# Patient Record
Sex: FEMALE | Race: BLACK OR AFRICAN AMERICAN | Employment: PART TIME | URBAN - METROPOLITAN AREA
[De-identification: names, ages, dates, MRNs, and addresses within clinical notes are randomized per-mention and may not be internally consistent; named-entity substitution may affect disease eponyms.]

---

## 2024-10-18 ENCOUNTER — APPOINTMENT (EMERGENCY)
Dept: CT IMAGING | Facility: HOSPITAL | Age: 39
End: 2024-10-18
Payer: MEDICAID

## 2024-10-18 ENCOUNTER — HOSPITAL ENCOUNTER (EMERGENCY)
Facility: HOSPITAL | Age: 39
Discharge: HOME | End: 2024-10-19
Attending: EMERGENCY MEDICINE
Payer: MEDICAID

## 2024-10-18 VITALS
OXYGEN SATURATION: 99 % | RESPIRATION RATE: 18 BRPM | DIASTOLIC BLOOD PRESSURE: 62 MMHG | HEART RATE: 90 BPM | TEMPERATURE: 98.7 F | SYSTOLIC BLOOD PRESSURE: 132 MMHG

## 2024-10-18 DIAGNOSIS — Z75.8 DOES NOT HAVE PRIMARY CARE PROVIDER: ICD-10-CM

## 2024-10-18 DIAGNOSIS — R51.9 ACUTE HEADACHE: ICD-10-CM

## 2024-10-18 DIAGNOSIS — R40.20 LOSS OF CONSCIOUSNESS (HCC): Primary | ICD-10-CM

## 2024-10-18 LAB
ALBUMIN SERPL BCG-MCNC: 4.5 G/DL (ref 3.5–5)
ALP SERPL-CCNC: 64 U/L (ref 34–104)
ALT SERPL W P-5'-P-CCNC: 39 U/L (ref 7–52)
ANION GAP SERPL CALCULATED.3IONS-SCNC: 10 MMOL/L (ref 4–13)
APTT PPP: 28 SECONDS (ref 23–34)
AST SERPL W P-5'-P-CCNC: 28 U/L (ref 13–39)
BASOPHILS # BLD AUTO: 0.07 THOUSANDS/ΜL (ref 0–0.1)
BASOPHILS NFR BLD AUTO: 1 % (ref 0–1)
BILIRUB DIRECT SERPL-MCNC: 0.07 MG/DL (ref 0–0.2)
BILIRUB SERPL-MCNC: 0.41 MG/DL (ref 0.2–1)
BILIRUB UR QL STRIP: NEGATIVE
BUN SERPL-MCNC: 10 MG/DL (ref 5–25)
CALCIUM SERPL-MCNC: 9 MG/DL (ref 8.4–10.2)
CHLORIDE SERPL-SCNC: 103 MMOL/L (ref 96–108)
CLARITY UR: CLEAR
CO2 SERPL-SCNC: 22 MMOL/L (ref 21–32)
COLOR UR: ABNORMAL
CREAT SERPL-MCNC: 0.88 MG/DL (ref 0.6–1.3)
EOSINOPHIL # BLD AUTO: 0.07 THOUSAND/ΜL (ref 0–0.61)
EOSINOPHIL NFR BLD AUTO: 1 % (ref 0–6)
ERYTHROCYTE [DISTWIDTH] IN BLOOD BY AUTOMATED COUNT: 23.8 % (ref 11.6–15.1)
EXT PREGNANCY TEST URINE: NEGATIVE
EXT. CONTROL: NORMAL
FLUAV AG UPPER RESP QL IA.RAPID: NEGATIVE
FLUBV AG UPPER RESP QL IA.RAPID: NEGATIVE
GFR SERPL CREATININE-BSD FRML MDRD: 83 ML/MIN/1.73SQ M
GLUCOSE SERPL-MCNC: 147 MG/DL (ref 65–140)
GLUCOSE UR STRIP-MCNC: NEGATIVE MG/DL
HCT VFR BLD AUTO: 37.4 % (ref 34.8–46.1)
HGB BLD-MCNC: 12.2 G/DL (ref 11.5–15.4)
HGB UR QL STRIP.AUTO: NEGATIVE
IMM GRANULOCYTES # BLD AUTO: 0.01 THOUSAND/UL (ref 0–0.2)
IMM GRANULOCYTES NFR BLD AUTO: 0 % (ref 0–2)
INR PPP: 1.12 (ref 0.85–1.19)
KETONES UR STRIP-MCNC: ABNORMAL MG/DL
LEUKOCYTE ESTERASE UR QL STRIP: NEGATIVE
LYMPHOCYTES # BLD AUTO: 2.62 THOUSANDS/ΜL (ref 0.6–4.47)
LYMPHOCYTES NFR BLD AUTO: 40 % (ref 14–44)
MAGNESIUM SERPL-MCNC: 2.1 MG/DL (ref 1.9–2.7)
MCH RBC QN AUTO: 22.5 PG (ref 26.8–34.3)
MCHC RBC AUTO-ENTMCNC: 32.6 G/DL (ref 31.4–37.4)
MCV RBC AUTO: 69 FL (ref 82–98)
MONOCYTES # BLD AUTO: 0.84 THOUSAND/ΜL (ref 0.17–1.22)
MONOCYTES NFR BLD AUTO: 13 % (ref 4–12)
NEUTROPHILS # BLD AUTO: 2.94 THOUSANDS/ΜL (ref 1.85–7.62)
NEUTS SEG NFR BLD AUTO: 45 % (ref 43–75)
NITRITE UR QL STRIP: NEGATIVE
NRBC BLD AUTO-RTO: 0 /100 WBCS
PH UR STRIP.AUTO: 8 [PH]
PHENYTOIN SERPL-MCNC: <2.5 UG/ML (ref 10–20)
PLATELET # BLD AUTO: 162 THOUSANDS/UL (ref 149–390)
POTASSIUM SERPL-SCNC: 3.8 MMOL/L (ref 3.5–5.3)
PROT SERPL-MCNC: 7.7 G/DL (ref 6.4–8.4)
PROT UR STRIP-MCNC: NEGATIVE MG/DL
PROTHROMBIN TIME: 15.1 SECONDS (ref 12.3–15)
RBC # BLD AUTO: 5.43 MILLION/UL (ref 3.81–5.12)
SARS-COV+SARS-COV-2 AG RESP QL IA.RAPID: NEGATIVE
SODIUM SERPL-SCNC: 135 MMOL/L (ref 135–147)
SP GR UR STRIP.AUTO: 1.01 (ref 1–1.03)
TSH SERPL DL<=0.05 MIU/L-ACNC: 1.1 UIU/ML (ref 0.45–4.5)
UROBILINOGEN UR STRIP-ACNC: <2 MG/DL
WBC # BLD AUTO: 6.55 THOUSAND/UL (ref 4.31–10.16)

## 2024-10-18 PROCEDURE — 80076 HEPATIC FUNCTION PANEL: CPT | Performed by: EMERGENCY MEDICINE

## 2024-10-18 PROCEDURE — 93005 ELECTROCARDIOGRAM TRACING: CPT

## 2024-10-18 PROCEDURE — 99285 EMERGENCY DEPT VISIT HI MDM: CPT | Performed by: EMERGENCY MEDICINE

## 2024-10-18 PROCEDURE — 96366 THER/PROPH/DIAG IV INF ADDON: CPT

## 2024-10-18 PROCEDURE — 85730 THROMBOPLASTIN TIME PARTIAL: CPT | Performed by: EMERGENCY MEDICINE

## 2024-10-18 PROCEDURE — 81025 URINE PREGNANCY TEST: CPT | Performed by: EMERGENCY MEDICINE

## 2024-10-18 PROCEDURE — 99284 EMERGENCY DEPT VISIT MOD MDM: CPT

## 2024-10-18 PROCEDURE — 96365 THER/PROPH/DIAG IV INF INIT: CPT

## 2024-10-18 PROCEDURE — 84443 ASSAY THYROID STIM HORMONE: CPT | Performed by: EMERGENCY MEDICINE

## 2024-10-18 PROCEDURE — 81003 URINALYSIS AUTO W/O SCOPE: CPT | Performed by: EMERGENCY MEDICINE

## 2024-10-18 PROCEDURE — 80048 BASIC METABOLIC PNL TOTAL CA: CPT | Performed by: EMERGENCY MEDICINE

## 2024-10-18 PROCEDURE — 36415 COLL VENOUS BLD VENIPUNCTURE: CPT | Performed by: EMERGENCY MEDICINE

## 2024-10-18 PROCEDURE — 83735 ASSAY OF MAGNESIUM: CPT | Performed by: EMERGENCY MEDICINE

## 2024-10-18 PROCEDURE — 80185 ASSAY OF PHENYTOIN TOTAL: CPT | Performed by: EMERGENCY MEDICINE

## 2024-10-18 PROCEDURE — 87804 INFLUENZA ASSAY W/OPTIC: CPT | Performed by: EMERGENCY MEDICINE

## 2024-10-18 PROCEDURE — 85610 PROTHROMBIN TIME: CPT | Performed by: EMERGENCY MEDICINE

## 2024-10-18 PROCEDURE — 70496 CT ANGIOGRAPHY HEAD: CPT

## 2024-10-18 PROCEDURE — 96375 TX/PRO/DX INJ NEW DRUG ADDON: CPT

## 2024-10-18 PROCEDURE — 85025 COMPLETE CBC W/AUTO DIFF WBC: CPT | Performed by: EMERGENCY MEDICINE

## 2024-10-18 PROCEDURE — 87811 SARS-COV-2 COVID19 W/OPTIC: CPT | Performed by: EMERGENCY MEDICINE

## 2024-10-18 RX ORDER — METOCLOPRAMIDE HYDROCHLORIDE 5 MG/ML
10 INJECTION INTRAMUSCULAR; INTRAVENOUS ONCE
Status: COMPLETED | OUTPATIENT
Start: 2024-10-18 | End: 2024-10-18

## 2024-10-18 RX ORDER — MAGNESIUM SULFATE HEPTAHYDRATE 40 MG/ML
2 INJECTION, SOLUTION INTRAVENOUS ONCE
Status: COMPLETED | OUTPATIENT
Start: 2024-10-18 | End: 2024-10-19

## 2024-10-18 RX ORDER — DIPHENHYDRAMINE HYDROCHLORIDE 50 MG/ML
25 INJECTION INTRAMUSCULAR; INTRAVENOUS ONCE
Status: COMPLETED | OUTPATIENT
Start: 2024-10-18 | End: 2024-10-18

## 2024-10-18 RX ORDER — PHENYTOIN SODIUM 100 MG/1
200 CAPSULE, EXTENDED RELEASE ORAL DAILY
COMMUNITY

## 2024-10-18 RX ADMIN — MAGNESIUM SULFATE HEPTAHYDRATE 2 G: 40 INJECTION, SOLUTION INTRAVENOUS at 20:00

## 2024-10-18 RX ADMIN — DIPHENHYDRAMINE HYDROCHLORIDE 25 MG: 50 INJECTION, SOLUTION INTRAMUSCULAR; INTRAVENOUS at 20:00

## 2024-10-18 RX ADMIN — IOHEXOL 85 ML: 350 INJECTION, SOLUTION INTRAVENOUS at 21:17

## 2024-10-18 RX ADMIN — METOCLOPRAMIDE 10 MG: 5 INJECTION, SOLUTION INTRAMUSCULAR; INTRAVENOUS at 20:00

## 2024-10-18 NOTE — ED PROVIDER NOTES
"Time reflects when diagnosis was documented in both MDM as applicable and the Disposition within this note       Time User Action Codes Description Comment    10/18/2024 11:42 PM Dolly Alex [R40.20] Loss of consciousness (HCC)     10/18/2024 11:42 PM Dolly Alex [R51.9] Acute headache     10/18/2024 11:47 PM Dolly Alex [Z75.8] Does not have primary care provider           ED Disposition       ED Disposition   Discharge    Condition   Fair    Date/Time   Fri Oct 18, 2024 11:42 PM    Comment   Philipalix Deniz discharge to home/self care.             Assessment & Plan       Medical Decision Making  This is a 38-year-old female who presents here today with an episode of loss of consciousness.  The patient says she was sitting eating dinner when she developed sudden onset of severe headache, loss consciousness.  She says she heard voices around her but was unable to move or respond.  She is having persistent headache.  She has had recent \"flu\" with nasal congestion and cough but denies any fevers.  She denies preceding head injuries or falling with the event tonight.  She denies any weakness or numbness of arms or legs.  She initially denied any prior problems with headaches, but then later says that she has had headaches with \"spells\" previously.  She does endorse prior episodes of loss of consciousness.  She said this happened during her menstrual cycle, and she was started on a medicine for it, that she takes when she has the \"spells.\"  She does state that she has been anemic before and taking medication to help with that.  She has a tablet of 100 mg of phenytoin with her, that she says is what she was prescribed to help with her \"spells,\" and only takes it that she needs it.  She denies taking it daily, any prior diagnosis of seizure disorders, any history of migraines.  She denies any other medical problems, including problems with her heart.  She " has no chest pain, palpitations, shortness of breath, recent nausea, vomiting, diarrhea.  Family told nursing that she seemed to be staring off into space and there is no witnessed seizure-like activity.  She did not fall out of the chair she was sitting in.    Review of systems: Otherwise negative unless stated as above    She is well-appearing, no acute distress.  She is occasionally repetitive, and occasionally has to have questions asked several times in order to be able to answer them.  She has no focal neurologic deficits.  Exam is otherwise unremarkable.  She may or may not have history of headaches, but given reported sudden onset of headache preceding syncopal event, there is concern for possible subarachnoid hemorrhage from aneurysmal bleed.  She may not have had full loss of consciousness that she is aware of voices around her, and with Dilantin use there is concern for possible history of seizures, though she denies a history of this and only strength of 100 mg, with as needed dosing is not typical for treatment of seizure disorder.  This may be due to dehydration, JOHN, electrolyte or thyroid abnormality, dysrhythmia.  I am not concerned about meningitis or encephalitis with recent URI symptoms given lack of fever or neck pain.  Will check lab work and CT scan to evaluate, and treat headache.    Glucose is mildly elevated at 147.  MCV is low at 69 though hemoglobin is normal.  Phenytoin level is undetectable.  Labs are otherwise unremarkable.  CT/CTA shows no acute abnormalities.  Headache has resolved.  I discussed with her findings, uncertain etiology of symptoms, management at home, need to establish follow-up care locally, and trying to get records from her OB doctor to better determine diagnosis and help with development of further treatment plan, and indications for return, and she expresses understanding with this plan.    Problems Addressed:  Acute headache: acute illness or injury  Does not have  "primary care provider: chronic illness or injury  Loss of consciousness (HCC): acute illness or injury    Amount and/or Complexity of Data Reviewed  Labs: ordered. Decision-making details documented in ED Course.  Radiology: ordered and independent interpretation performed. Decision-making details documented in ED Course.  ECG/medicine tests: ordered and independent interpretation performed. Decision-making details documented in ED Course.    Risk  Prescription drug management.             Medications   metoclopramide (REGLAN) injection 10 mg (10 mg Intravenous Given 10/18/24 2000)   diphenhydrAMINE (BENADRYL) injection 25 mg (25 mg Intravenous Given 10/18/24 2000)   magnesium sulfate 2 g/50 mL IVPB (premix) 2 g (2 g Intravenous New Bag 10/18/24 2000)   iohexol (OMNIPAQUE) 350 MG/ML injection (MULTI-DOSE) 85 mL (85 mL Intravenous Given 10/18/24 2117)       ED Risk Strat Scores                           SBIRT 22yo+      Flowsheet Row Most Recent Value   Initial Alcohol Screen: US AUDIT-C     1. How often do you have a drink containing alcohol? 0 Filed at: 10/18/2024 1836   2. How many drinks containing alcohol do you have on a typical day you are drinking?  0 Filed at: 10/18/2024 1836   3b. FEMALE Any Age, or MALE 65+: How often do you have 4 or more drinks on one occassion? 0 Filed at: 10/18/2024 1836   Audit-C Score 0 Filed at: 10/18/2024 1836   KATELIN: How many times in the past year have you...    Used an illegal drug or used a prescription medication for non-medical reasons? Never Filed at: 10/18/2024 1836                            History of Present Illness       Chief Complaint   Patient presents with    Syncope     Patient arrived to ED via EMS \"states she was about to eat and had syncope episode while in sitting position\", patient is alert and oriented x 4, c/o frontal headache\"       Past Medical History:   Diagnosis Date    Seizure (HCC)       History reviewed. No pertinent surgical history.   History " reviewed. No pertinent family history.   Social History     Tobacco Use    Smoking status: Never    Smokeless tobacco: Never   Substance Use Topics    Alcohol use: Never    Drug use: Never      E-Cigarette/Vaping      E-Cigarette/Vaping Substances      I have reviewed and agree with the history as documented.        used: [facility] [employer] [facility] [id number] for history, [id number] for results and discharge.        Review of Systems   Cardiovascular:  Positive for syncope.           Objective       ED Triage Vitals [10/18/24 1832]   Temperature Pulse Blood Pressure Respirations SpO2 Patient Position - Orthostatic VS   98.7 °F (37.1 °C) 93 148/76 17 100 % --      Temp Source Heart Rate Source BP Location FiO2 (%) Pain Score    Oral Monitor Right arm -- --      Vitals      Date and Time Temp Pulse SpO2 Resp BP Pain Score FACES Pain Rating User   10/18/24 2302 -- 90 99 % 18 -- -- -- KB   10/18/24 2100 -- 93 99 % 18 -- -- -- KB   10/18/24 2030 -- 90 99 % 18 132/62 -- -- KB   10/18/24 1832 98.7 °F (37.1 °C) 93 100 % 17 148/76 -- -- MI            Physical Exam  Vitals and nursing note reviewed.   Constitutional:       General: She is not in acute distress.     Appearance: She is well-developed.   HENT:      Head: Normocephalic and atraumatic.   Eyes:      Extraocular Movements: Extraocular movements intact.      Conjunctiva/sclera: Conjunctivae normal.      Pupils: Pupils are equal, round, and reactive to light.   Neck:      Trachea: No tracheal deviation.   Cardiovascular:      Rate and Rhythm: Normal rate and regular rhythm.      Heart sounds: Normal heart sounds.   Pulmonary:      Effort: Pulmonary effort is normal. No respiratory distress.      Breath sounds: Normal breath sounds.   Abdominal:      General: There is no distension.      Palpations: Abdomen is soft.      Tenderness: There is no abdominal tenderness.   Musculoskeletal:      Cervical back: Normal range of motion. No pain with movement. Normal range of motion.    Skin:     General: Skin is warm and dry.   Neurological:      General: No focal deficit present.      Mental Status: She is alert and oriented to person, place, and time.      GCS: GCS eye subscore is 4. GCS verbal subscore is 5. GCS motor subscore is 6.      Cranial Nerves: No cranial nerve deficit, dysarthria or facial asymmetry.      Sensory: Sensation is intact. No sensory deficit.      Motor: Motor function is intact. No weakness.      Coordination: Coordination normal. Finger-Nose-Finger Test normal.      Comments: Occasionally repetitive, occasionally needs questions repeated to be able to answer them   Psychiatric:         Mood and Affect: Mood and affect normal.         Behavior: Behavior normal.         Results Reviewed       Procedure Component Value Units Date/Time    FLU/COVID Rapid Antigen (30 min. TAT) - Preferred screening test in ED [[id number]]  (Normal) Collected: 10/18/24 1958    Lab Status: Final result Specimen: Nares from Nose Updated: 10/18/24 2040     SARS COV Rapid Antigen Negative     Influenza A Rapid Antigen Negative     Influenza B Rapid Antigen Negative    Narrative:      This test has been performed using the Quidel Amy 2 FLU+SARS Antigen test under the Emergency Use Authorization (EUA). This test has been validated by the  and verified by the performing laboratory. The Amy uses lateral flow immunofluorescent sandwich assay to detect SARS-COV, Influenza A and Influenza B Antigen.     The Quidel Amy 2 SARS Antigen test does not differentiate between SARS-CoV and SARS-CoV-2.     Negative results are presumptive and may be confirmed with a molecular assay, if necessary, for patient management. Negative results do not rule out SARS-CoV-2 or influenza infection and should not be used as the sole basis for treatment or patient management decisions. A negative test result may occur if the level of antigen in a sample is below the limit of detection of this test.      Positive results are indicative of the presence of viral antigens, but do not rule out bacterial infection or co-infection with other viruses.     All test results should be used as an adjunct to clinical observations and other information available to the provider.    FOR PEDIATRIC PATIENTS - copy/paste COVID Guidelines URL to browser: [email or web address]    TSH, 3rd generation with Free T4 reflex [[id number]]  (Normal) Collected: 10/18/24 1958    Lab Status: Final result Specimen: Blood from Arm, Right Updated: 10/18/24 2039     TSH 3RD GENERATON 1.105 uIU/mL     Basic metabolic panel [[id number]]  (Abnormal) Collected: 10/18/24 1958    Lab Status: Final result Specimen: Blood from Arm, Right Updated: 10/18/24 2034     Sodium 135 mmol/L      Potassium 3.8 mmol/L      Chloride 103 mmol/L      CO2 22 mmol/L      ANION GAP 10 mmol/L      BUN 10 mg/dL      Creatinine 0.88 mg/dL      Glucose 147 mg/dL      Calcium 9.0 mg/dL      eGFR 83 ml/min/1.73sq m     Narrative:      National Kidney Disease Foundation guidelines for Chronic Kidney Disease (CKD):     Stage 1 with normal or high GFR (GFR > 90 mL/min/1.73 square meters)    Stage 2 Mild CKD (GFR = 60-89 mL/min/1.73 square meters)    Stage 3A Moderate CKD (GFR = 45-59 mL/min/1.73 square meters)    Stage 3B Moderate CKD (GFR = 30-44 mL/min/1.73 square meters)    Stage 4 Severe CKD (GFR = 15-29 mL/min/1.73 square meters)    Stage 5 End Stage CKD (GFR <15 mL/min/1.73 square meters)  Note: GFR calculation is accurate only with a steady state creatinine    Hepatic function panel [[id number]]  (Normal) Collected: 10/18/24 1958    Lab Status: Final result Specimen: Blood from Arm, Right Updated: 10/18/24 2034     Total Bilirubin 0.41 mg/dL      Bilirubin, Direct 0.07 mg/dL      Alkaline Phosphatase 64 U/L      AST 28 U/L      ALT 39 U/L      Total Protein 7.7 g/dL      Albumin 4.5 g/dL     Magnesium [[id number]]  (Normal)  Collected: 10/18/24 1958    Lab Status: Final result Specimen: Blood from Arm, Right Updated: 10/18/24 2034     Magnesium 2.1 mg/dL     Phenytoin level, total [[id number]]  (Abnormal) Collected: 10/18/24 1958    Lab Status: Final result Specimen: Blood from Arm, Right Updated: 10/18/24 2034     Phenytoin Lvl <2.5 ug/mL     Protime-INR [[id number]]  (Abnormal) Collected: 10/18/24 1958    Lab Status: Final result Specimen: Blood from Arm, Right Updated: 10/18/24 2030     Protime 15.1 seconds      INR 1.12    Narrative:      INR Therapeutic Range    Indication                                             INR Range      Atrial Fibrillation                                               2.0-3.0  Hypercoagulable State                                    2.0.2.3  Left Ventricular Asist Device                            2.0-3.0  Mechanical Heart Valve                                  -    Aortic(with afib, MI, embolism, HF, LA enlargement,    and/or coagulopathy)                                     2.0-3.0 (2.5-3.5)     Mitral                                                             2.5-3.5  Prosthetic/Bioprosthetic Heart Valve               2.0-3.0  Venous thromboembolism (VTE: VT, PE        2.0-3.0    APTT [[id number]]  (Normal) Collected: 10/18/24 1958    Lab Status: Final result Specimen: Blood from Arm, Right Updated: 10/18/24 2030     PTT 28 seconds     UA w Reflex to Microscopic w Reflex to Culture [[id number]]  (Abnormal) Collected: 10/18/24 2005    Lab Status: Final result Specimen: Urine, Clean Catch Updated: 10/18/24 2029     Color, UA Light Yellow     Clarity, UA Clear     Specific Gravity, UA 1.012     pH, UA 8.0     Leukocytes, UA Negative     Nitrite, UA Negative     Protein, UA Negative mg/dl      Glucose, UA Negative mg/dl      Ketones, UA Trace mg/dl      Urobilinogen, UA <2.0 mg/dl      Bilirubin, UA Negative     Occult Blood, UA Negative    CBC and differential [[id number]]  (Abnormal) Collected: 10/18/24  1958    Lab Status: Final result Specimen: Blood from Arm, Right Updated: 10/18/24 2015     WBC 6.55 Thousand/uL      RBC 5.43 Million/uL      Hemoglobin 12.2 g/dL      Hematocrit 37.4 %      MCV 69 fL      MCH 22.5 pg      MCHC 32.6 g/dL      RDW 23.8 %      Platelets 162 Thousands/uL      nRBC 0 /100 WBCs      Segmented % 45 %      Immature Grans % 0 %      Lymphocytes % 40 %      Monocytes % 13 %      Eosinophils Relative 1 %      Basophils Relative 1 %      Absolute Neutrophils 2.94 Thousands/µL      Absolute Immature Grans 0.01 Thousand/uL      Absolute Lymphocytes 2.62 Thousands/µL      Absolute Monocytes 0.84 Thousand/µL      Eosinophils Absolute 0.07 Thousand/µL      Basophils Absolute 0.07 Thousands/µL     POCT pregnancy, urine [[id number]]  (Normal) Resulted: 10/18/24 2009    Lab Status: Final result Updated: 10/18/24 2009     EXT Preg Test, Ur Negative     Control Valid            CTA head with and without contrast   Final Interpretation by Janel Kerr MD (10/18 2320)      No acute intracranial abnormality.      No large vessel occlusion. No hemodynamically significant stenosis.      Workstation performed: [id number]             ECG 12 Lead Documentation Only    Date/Time: 10/18/2024 7:46 PM    Performed by: Dolly Alex MD  Authorized by: Dolly Alex MD    Indications / Diagnosis:  Syncope  ECG reviewed by me, the ED Provider: yes    Patient location:  ED  Previous ECG:     Previous ECG:  Unavailable  Interpretation:     Interpretation: normal    Rate:     ECG rate:  85    ECG rate assessment: normal    Rhythm:     Rhythm: sinus rhythm    Ectopy:     Ectopy: none    QRS:     QRS axis:  Normal    QRS intervals:  Normal  Conduction:     Conduction: normal    ST segments:     ST segments:  Normal  T waves:     T waves: normal        ED Medication and Procedure Management   Prior to Admission Medications   Prescriptions Last Dose Informant Patient Reported? Taking?    phenytoin (DILANTIN) 100 mg ER capsule  Self Yes Yes   Sig: Take 200 mg by mouth daily Patient takes 100 mg in AM and then 100 mg in PM      Facility-Administered Medications: None     Patient's Medications   Discharge Prescriptions    No medications on file       ED SEPSIS DOCUMENTATION   Time reflects when diagnosis was documented in both MDM as applicable and the Disposition within this note       Time User Action Codes Description Comment    10/18/2024 11:42 PM Dolly Alex [R40.20] Loss of consciousness (HCC)     10/18/2024 11:42 PM Dolly Alex [R51.9] Acute headache     10/18/2024 11:47 PM Dolly Alex [Z75.8] Does not have primary care provider                  Dolly Alex MD  10/19/24 0019

## 2024-10-19 LAB
ATRIAL RATE: 85 BPM
P AXIS: 67 DEGREES
PR INTERVAL: 136 MS
QRS AXIS: 81 DEGREES
QRSD INTERVAL: 82 MS
QT INTERVAL: 348 MS
QTC INTERVAL: 414 MS
T WAVE AXIS: 66 DEGREES
VENTRICULAR RATE: 85 BPM

## 2024-10-19 PROCEDURE — 93010 ELECTROCARDIOGRAM REPORT: CPT | Performed by: STUDENT IN AN ORGANIZED HEALTH CARE EDUCATION/TRAINING PROGRAM

## 2024-10-19 NOTE — DISCHARGE INSTRUCTIONS
Set up care with a new primary care doctor for further evaluation and management of your symptoms.  Try to get in touch with your old doctor from the [place] to get records on prior workup and diagnoses that you were given.  Be careful driving until you follow-up.  Return for worsening or changing symptoms, or for any other concerns.

## 2024-11-25 ENCOUNTER — HOSPITAL ENCOUNTER (INPATIENT)
Facility: HOSPITAL | Age: 39
LOS: 2 days | Discharge: HOME | DRG: 053 | End: 2024-11-27
Attending: EMERGENCY MEDICINE | Admitting: STUDENT IN AN ORGANIZED HEALTH CARE EDUCATION/TRAINING PROGRAM
Payer: MEDICAID

## 2024-11-25 ENCOUNTER — APPOINTMENT (EMERGENCY)
Dept: CT IMAGING | Facility: HOSPITAL | Age: 39
DRG: 053 | End: 2024-11-25
Payer: MEDICAID

## 2024-11-25 ENCOUNTER — APPOINTMENT (EMERGENCY)
Dept: RADIOLOGY | Facility: HOSPITAL | Age: 39
DRG: 053 | End: 2024-11-25
Payer: MEDICAID

## 2024-11-25 ENCOUNTER — TELEPHONE (OUTPATIENT)
Age: 39
End: 2024-11-25

## 2024-11-25 DIAGNOSIS — K35.80 ACUTE APPENDICITIS, UNSPECIFIED ACUTE APPENDICITIS TYPE: Primary | ICD-10-CM

## 2024-11-25 DIAGNOSIS — R56.9 SEIZURE (HCC): ICD-10-CM

## 2024-11-25 DIAGNOSIS — Z13.9 ENCOUNTER FOR SCREENING INVOLVING SOCIAL DETERMINANTS OF HEALTH (SDOH): ICD-10-CM

## 2024-11-25 DIAGNOSIS — R10.31 ACUTE RIGHT LOWER QUADRANT PAIN: ICD-10-CM

## 2024-11-25 DIAGNOSIS — K35.80 ACUTE APPENDICITIS: ICD-10-CM

## 2024-11-25 DIAGNOSIS — R56.9 SEIZURES (HCC): ICD-10-CM

## 2024-11-25 PROBLEM — K35.30 ACUTE APPENDICITIS WITH LOCALIZED PERITONITIS: Status: ACTIVE | Noted: 2024-11-25

## 2024-11-25 LAB
ALBUMIN SERPL BCG-MCNC: 4.1 G/DL (ref 3.5–5)
ALP SERPL-CCNC: 56 U/L (ref 34–104)
ALT SERPL W P-5'-P-CCNC: 19 U/L (ref 7–52)
AMPHETAMINES SERPL QL SCN: NEGATIVE
ANION GAP SERPL CALCULATED.3IONS-SCNC: 7 MMOL/L (ref 4–13)
ANISOCYTOSIS BLD QL SMEAR: PRESENT
AST SERPL W P-5'-P-CCNC: 17 U/L (ref 13–39)
ATRIAL RATE: 97 BPM
BARBITURATES UR QL: NEGATIVE
BASOPHILS # BLD MANUAL: 0.07 THOUSAND/UL (ref 0–0.1)
BASOPHILS NFR MAR MANUAL: 1 % (ref 0–1)
BENZODIAZ UR QL: NEGATIVE
BILIRUB DIRECT SERPL-MCNC: 0.07 MG/DL (ref 0–0.2)
BILIRUB SERPL-MCNC: 0.36 MG/DL (ref 0.2–1)
BILIRUB UR QL STRIP: NEGATIVE
BUN SERPL-MCNC: 6 MG/DL (ref 5–25)
CALCIUM SERPL-MCNC: 8.6 MG/DL (ref 8.4–10.2)
CHLORIDE SERPL-SCNC: 106 MMOL/L (ref 96–108)
CLARITY UR: CLEAR
CO2 SERPL-SCNC: 22 MMOL/L (ref 21–32)
COCAINE UR QL: NEGATIVE
COLOR UR: COLORLESS
CREAT SERPL-MCNC: 0.78 MG/DL (ref 0.6–1.3)
EOSINOPHIL # BLD MANUAL: 0 THOUSAND/UL (ref 0–0.4)
EOSINOPHIL NFR BLD MANUAL: 0 % (ref 0–6)
ERYTHROCYTE [DISTWIDTH] IN BLOOD BY AUTOMATED COUNT: 18.5 % (ref 11.6–15.1)
FENTANYL UR QL SCN: NEGATIVE
FLUAV AG UPPER RESP QL IA.RAPID: NEGATIVE
FLUBV AG UPPER RESP QL IA.RAPID: NEGATIVE
GFR SERPL CREATININE-BSD FRML MDRD: 96 ML/MIN/1.73SQ M
GLUCOSE SERPL-MCNC: 122 MG/DL (ref 65–140)
GLUCOSE UR STRIP-MCNC: NEGATIVE MG/DL
HCG SERPL QL: NEGATIVE
HCT VFR BLD AUTO: 36.4 % (ref 34.8–46.1)
HGB BLD-MCNC: 12.3 G/DL (ref 11.5–15.4)
HGB UR QL STRIP.AUTO: NEGATIVE
HYDROCODONE UR QL SCN: NEGATIVE
HYPERCHROMIA BLD QL SMEAR: PRESENT
KETONES UR STRIP-MCNC: NEGATIVE MG/DL
LACTATE SERPL-SCNC: 1.2 MMOL/L (ref 0.5–2)
LEUKOCYTE ESTERASE UR QL STRIP: NEGATIVE
LYMPHOCYTES # BLD AUTO: 1.77 THOUSAND/UL (ref 0.6–4.47)
LYMPHOCYTES # BLD AUTO: 25 % (ref 14–44)
MAGNESIUM SERPL-MCNC: 2 MG/DL (ref 1.9–2.7)
MCH RBC QN AUTO: 24 PG (ref 26.8–34.3)
MCHC RBC AUTO-ENTMCNC: 33.8 G/DL (ref 31.4–37.4)
MCV RBC AUTO: 71 FL (ref 82–98)
METHADONE UR QL: NEGATIVE
MICROCYTES BLD QL AUTO: PRESENT
MONOCYTES # BLD AUTO: 0.14 THOUSAND/UL (ref 0–1.22)
MONOCYTES NFR BLD: 2 % (ref 4–12)
NEUTROPHILS # BLD MANUAL: 5.09 THOUSAND/UL (ref 1.85–7.62)
NEUTS BAND NFR BLD MANUAL: 2 % (ref 0–8)
NEUTS SEG NFR BLD AUTO: 70 % (ref 43–75)
NITRITE UR QL STRIP: NEGATIVE
OPIATES UR QL SCN: NEGATIVE
OXYCODONE+OXYMORPHONE UR QL SCN: NEGATIVE
P AXIS: 71 DEGREES
PCP UR QL: NEGATIVE
PH UR STRIP.AUTO: 7.5 [PH]
PHENYTOIN SERPL-MCNC: <2.5 UG/ML (ref 10–20)
PLATELET # BLD AUTO: 164 THOUSANDS/UL (ref 149–390)
PLATELET BLD QL SMEAR: ADEQUATE
POTASSIUM SERPL-SCNC: 3.8 MMOL/L (ref 3.5–5.3)
PR INTERVAL: 128 MS
PROCALCITONIN SERPL-MCNC: <0.05 NG/ML
PROT SERPL-MCNC: 7 G/DL (ref 6.4–8.4)
PROT UR STRIP-MCNC: NEGATIVE MG/DL
QRS AXIS: 79 DEGREES
QRSD INTERVAL: 82 MS
QT INTERVAL: 330 MS
QTC INTERVAL: 419 MS
RBC # BLD AUTO: 5.13 MILLION/UL (ref 3.81–5.12)
RBC MORPH BLD: PRESENT
SARS-COV+SARS-COV-2 AG RESP QL IA.RAPID: NEGATIVE
SODIUM SERPL-SCNC: 135 MMOL/L (ref 135–147)
SP GR UR STRIP.AUTO: 1.01 (ref 1–1.03)
T WAVE AXIS: 24 DEGREES
TARGETS BLD QL SMEAR: PRESENT
THC UR QL: NEGATIVE
UROBILINOGEN UR STRIP-ACNC: <2 MG/DL
VENTRICULAR RATE: 97 BPM
WBC # BLD AUTO: 7.07 THOUSAND/UL (ref 4.31–10.16)

## 2024-11-25 PROCEDURE — 93005 ELECTROCARDIOGRAM TRACING: CPT

## 2024-11-25 PROCEDURE — 87040 BLOOD CULTURE FOR BACTERIA: CPT | Performed by: EMERGENCY MEDICINE

## 2024-11-25 PROCEDURE — 83735 ASSAY OF MAGNESIUM: CPT | Performed by: EMERGENCY MEDICINE

## 2024-11-25 PROCEDURE — 84703 CHORIONIC GONADOTROPIN ASSAY: CPT | Performed by: EMERGENCY MEDICINE

## 2024-11-25 PROCEDURE — 72125 CT NECK SPINE W/O DYE: CPT

## 2024-11-25 PROCEDURE — 74177 CT ABD & PELVIS W/CONTRAST: CPT

## 2024-11-25 PROCEDURE — 70450 CT HEAD/BRAIN W/O DYE: CPT

## 2024-11-25 PROCEDURE — 96365 THER/PROPH/DIAG IV INF INIT: CPT

## 2024-11-25 PROCEDURE — 80307 DRUG TEST PRSMV CHEM ANLYZR: CPT | Performed by: EMERGENCY MEDICINE

## 2024-11-25 PROCEDURE — 81003 URINALYSIS AUTO W/O SCOPE: CPT | Performed by: EMERGENCY MEDICINE

## 2024-11-25 PROCEDURE — 85027 COMPLETE CBC AUTOMATED: CPT | Performed by: EMERGENCY MEDICINE

## 2024-11-25 PROCEDURE — 96361 HYDRATE IV INFUSION ADD-ON: CPT

## 2024-11-25 PROCEDURE — 80185 ASSAY OF PHENYTOIN TOTAL: CPT | Performed by: EMERGENCY MEDICINE

## 2024-11-25 PROCEDURE — 96375 TX/PRO/DX INJ NEW DRUG ADDON: CPT

## 2024-11-25 PROCEDURE — 93010 ELECTROCARDIOGRAM REPORT: CPT | Performed by: INTERNAL MEDICINE

## 2024-11-25 PROCEDURE — 71045 X-RAY EXAM CHEST 1 VIEW: CPT

## 2024-11-25 PROCEDURE — 99285 EMERGENCY DEPT VISIT HI MDM: CPT | Performed by: EMERGENCY MEDICINE

## 2024-11-25 PROCEDURE — 99222 1ST HOSP IP/OBS MODERATE 55: CPT | Performed by: STUDENT IN AN ORGANIZED HEALTH CARE EDUCATION/TRAINING PROGRAM

## 2024-11-25 PROCEDURE — 99285 EMERGENCY DEPT VISIT HI MDM: CPT

## 2024-11-25 PROCEDURE — 36415 COLL VENOUS BLD VENIPUNCTURE: CPT | Performed by: EMERGENCY MEDICINE

## 2024-11-25 PROCEDURE — 96367 TX/PROPH/DG ADDL SEQ IV INF: CPT

## 2024-11-25 PROCEDURE — 99245 OFF/OP CONSLTJ NEW/EST HI 55: CPT | Performed by: PHYSICIAN ASSISTANT

## 2024-11-25 PROCEDURE — 80048 BASIC METABOLIC PNL TOTAL CA: CPT | Performed by: EMERGENCY MEDICINE

## 2024-11-25 PROCEDURE — 80076 HEPATIC FUNCTION PANEL: CPT | Performed by: EMERGENCY MEDICINE

## 2024-11-25 PROCEDURE — 85007 BL SMEAR W/DIFF WBC COUNT: CPT | Performed by: EMERGENCY MEDICINE

## 2024-11-25 PROCEDURE — 84145 PROCALCITONIN (PCT): CPT | Performed by: EMERGENCY MEDICINE

## 2024-11-25 PROCEDURE — 87804 INFLUENZA ASSAY W/OPTIC: CPT | Performed by: EMERGENCY MEDICINE

## 2024-11-25 PROCEDURE — 83605 ASSAY OF LACTIC ACID: CPT | Performed by: EMERGENCY MEDICINE

## 2024-11-25 PROCEDURE — 87811 SARS-COV-2 COVID19 W/OPTIC: CPT | Performed by: EMERGENCY MEDICINE

## 2024-11-25 RX ORDER — METRONIDAZOLE 500 MG/100ML
500 INJECTION, SOLUTION INTRAVENOUS EVERY 8 HOURS
Status: DISCONTINUED | OUTPATIENT
Start: 2024-11-25 | End: 2024-11-26

## 2024-11-25 RX ORDER — LORAZEPAM 2 MG/ML
1 INJECTION INTRAMUSCULAR ONCE
Status: COMPLETED | OUTPATIENT
Start: 2024-11-25 | End: 2024-11-25

## 2024-11-25 RX ORDER — HEPARIN SODIUM 5000 [USP'U]/ML
5000 INJECTION, SOLUTION INTRAVENOUS; SUBCUTANEOUS EVERY 8 HOURS SCHEDULED
Status: CANCELLED | OUTPATIENT
Start: 2024-11-25

## 2024-11-25 RX ORDER — ACETAMINOPHEN 10 MG/ML
1000 INJECTION, SOLUTION INTRAVENOUS ONCE
Status: COMPLETED | OUTPATIENT
Start: 2024-11-25 | End: 2024-11-25

## 2024-11-25 RX ADMIN — IOHEXOL 100 ML: 350 INJECTION, SOLUTION INTRAVENOUS at 15:15

## 2024-11-25 RX ADMIN — SODIUM CHLORIDE 1000 ML: 0.9 INJECTION, SOLUTION INTRAVENOUS at 13:24

## 2024-11-25 RX ADMIN — LORAZEPAM 1 MG: 2 INJECTION INTRAMUSCULAR; INTRAVENOUS at 13:28

## 2024-11-25 RX ADMIN — CEFTRIAXONE SODIUM 1000 MG: 10 INJECTION, POWDER, FOR SOLUTION INTRAVENOUS at 17:42

## 2024-11-25 RX ADMIN — SODIUM CHLORIDE 1500 MG PE: 9 INJECTION, SOLUTION INTRAVENOUS at 15:34

## 2024-11-25 RX ADMIN — METRONIDAZOLE 500 MG: 500 INJECTION, SOLUTION INTRAVENOUS at 17:44

## 2024-11-25 RX ADMIN — ACETAMINOPHEN 1000 MG: 10 INJECTION INTRAVENOUS at 13:25

## 2024-11-25 RX ADMIN — CEFTRIAXONE SODIUM 1000 MG: 10 INJECTION, POWDER, FOR SOLUTION INTRAVENOUS at 21:21

## 2024-11-25 NOTE — ASSESSMENT & PLAN NOTE
Presented via EMS after seizure at home with report of fall and possible head strike. Also witnessed again by EMS and then with ED staff  H/o seizures, on Dilantin. Levels found to be subtherapeutic in ED and Neurology consulted.   Treated in ED and patient improved  Pt notes headache and abdominal pain    CT cervical Spine: No cervical spine fracture or traumatic malalignment.   CT Head: No acute intracranial abnormality.     Plan:  Continue with care per medicine team and neurology  Appreciate recommendations to proceed with surgical intervention for appendicitis

## 2024-11-25 NOTE — ED NOTES
Patient had a seizure episode. Lasted 1 minutes.  Patient is calm now. Stated she get's aura.      Melinda Benítez RN  11/25/24 1259       Melinda Benítez RN  11/25/24 1254       Melinda Benítez RN  11/25/24 1787    
Patient transported to CT with this RN, remains monitored with vital WNL at this time.      Belen Fortune  11/25/24 1524    
Returned from CT with patient, VSS, reapplied purewick.   Requested CEREBYX from pharmacy to be sent to tube station 111.     Belen Fortune  11/25/24 2413    
This RN witnessed seizure activity lasting less than 1 minute. Provider made aware.     Melinda Benítez RN  11/25/24 1997    
With the permission  of the patient, this nurse called the aunt Luigi at [phone] at 4:35 pm. I informed her of the CT-Scan results of Appendicitis and that a consult for surgery was placed. The aunt stated she will return shortly.       Melinda Benítez RN  11/25/24 6994    
22-Oct-2021 18:29

## 2024-11-25 NOTE — ED PROVIDER NOTES
Time reflects when diagnosis was documented in both MDM as applicable and the Disposition within this note       Time User Action Codes Description Comment    11/25/2024  5:21 PM Stas Harshad Add [K35.80] Acute appendicitis, unspecified acute appendicitis type     11/25/2024  5:23 PM Anna Eaton Add [K35.80] Acute appendicitis     11/25/2024  5:23 PM Anna Eaton Add [R10.31] Acute right lower quadrant pain     11/25/2024  5:24 PM Anna Eaton Add [R56.9] Seizures (HCC)           ED Disposition       ED Disposition   Admit    Condition   Stable    Date/Time   Mon Nov 25, 2024  5:24 PM    Comment   Case was discussed with [facility] and general surgery and the patient's admission status was agreed to be Admission Status: inpatient status to the service of Dr. Carrasquillo.               Assessment & Plan       Medical Decision Making  38-year-old female presents to the ED for recurrent seizure.  Patient has history of seizures for which she takes phenytoin 100 mg twice daily.  She reports compliance with medication.  Patient had a seizure while at home for which 911 was called.  On arrival to the ED, patient also had a very brief several second convulsive seizure that was witnessed by nursing.  I evaluated patient several minutes after the seizure that occurred in the ED and she is awake, alert and does not appear postictal.  Will workup with EKG, basic labs, phenytoin level.  Will also obtain UA, urine pregnancy test, COVID/flu swab.  Will obtain CT scan of the head and cervical spine to rule out traumatic injury given the fall.  Will also obtain CT scan of the abdomen and pelvis given complaints of abdominal pain with significant tenderness to rule out acute pathology such as injury, appendicitis, cholecystitis, diverticulitis.  Will provide IV Tylenol for headache, IV fluid bolus.  Will also give IV Ativan to prevent further seizure activity.    Amount and/or Complexity of Data Reviewed  Independent Historian:  EMS     Details: Patient's aunt  Labs: ordered. Decision-making details documented in ED Course.  Radiology: ordered and independent interpretation performed. Decision-making details documented in ED Course.  ECG/medicine tests: ordered and independent interpretation performed. Decision-making details documented in ED Course.    Risk  Prescription drug management.        ED Course as of 11/25/24 1838   Mon Nov 25, 2024   1347 WBC: 7.07   1426 PHENYTOIN LEVEL (DILANTIN)(!): <2.5  Patient's phenytoin level is subtherapeutic.  Will discuss with on-call neurology to see if patient would benefit from change in dosing while she awaits outpatient follow-up.   1445 Nursing just informed me that patient had another convulsive seizure-like activity in the ED just now lasting less than 1 minute.  I immediately assessed patient and she was sleeping comfortably.  She was easily arousable, answering questions appropriately and does not appear postictal.  Will discuss with neurology and likely recommend observation admission given that she has had now 3 seizures today.   1450 Messaged on-call neurologist, Dr. Gilbert.    1459 Dr. Gilbert did not feel admission was necessary and does not believe patient is taking her Phenetron given the subtherapeutic level.  He recommended giving a loading dose of fosphenytoin 10 to 15 mg/kg IV in the ED now and to consider represcribing her the phenytoin 100 mg which should be extended release tablet once daily to ensure she is getting the correct medication as her previous medication came from the [place].   1613 Dr. Song, radiologist, called to discuss significant CTa/p findings of early acute appendicitis. Will discuss with general surgery.    1620 Messaged on call general surgeon, Dr. Mcclelland.    1632 ED nurse provided translation to update patient about significant CT findings and need for surgical consultation.   1702 Surgery at bedside evaluating patient.    1712 Surgical  team recommended medical admission with surgical consultation due to the seizures.   1721 [facility] accepted admission. Surgery plants to wait to take patient to OR given the seizures today and recommended starting Rocephin and Flagyl.         Medications   metroNIDAZOLE (FLAGYL) IVPB (premix) 500 mg 100 mL (500 mg Intravenous New Bag 11/25/24 1744)   sodium chloride 0.9 % bolus 1,000 mL (0 mL Intravenous Stopped 11/25/24 1450)   LORazepam (ATIVAN) injection 1 mg (1 mg Intravenous Given 11/25/24 1328)   acetaminophen (Ofirmev) injection 1,000 mg (0 mg Intravenous Stopped 11/25/24 1346)   fosphenytoin (CEREBYX) 1,500 mg PE in sodium chloride 0.9 % 100 mL IVPB bolus (0 mg PE Intravenous Stopped 11/25/24 1610)   iohexol (OMNIPAQUE) 350 MG/ML injection (MULTI-DOSE) 100 mL (100 mL Intravenous Given 11/25/24 1515)   ceftriaxone (ROCEPHIN) 1 g/50 mL in dextrose IVPB (1,000 mg Intravenous New Bag 11/25/24 1742)       ED Risk Strat Scores                           SBIRT 22yo+      Flowsheet Row Most Recent Value   Initial Alcohol Screen: US AUDIT-C     1. How often do you have a drink containing alcohol? 0 Filed at: 11/25/2024 1308   2. How many drinks containing alcohol do you have on a typical day you are drinking?  0 Filed at: 11/25/2024 1308   3a. Male UNDER 65: How often do you have five or more drinks on one occasion? 0 Filed at: 11/25/2024 1308   3b. FEMALE Any Age, or MALE 65+: How often do you have 4 or more drinks on one occassion? 0 Filed at: 11/25/2024 1308   Audit-C Score 0 Filed at: 11/25/2024 1308   KATELIN: How many times in the past year have you...    Used an illegal drug or used a prescription medication for non-medical reasons? Never Filed at: 11/25/2024 1308                            History of Present Illness       Chief Complaint   Patient presents with    Seizure - Prior Hx Of     Patient arrived via EMS. Patient was online studying when seizure occurred, patient unsure of falling and unsure of head strike.  + seizures medications from [place] but unaware of the name.        Past Medical History:   Diagnosis Date    Seizure (HCC)       No past surgical history on file.   No family history on file.   Social History     Tobacco Use    Smoking status: Never    Smokeless tobacco: Never   Substance Use Topics    Alcohol use: Never    Drug use: Never      E-Cigarette/Vaping      E-Cigarette/Vaping Substances      I have reviewed and agree with the history as documented.     Patient is a 38-year-old female with past medical history of seizure disorder, presents to the emergency department by ambulance for seizure at home.  Patient's aunt provided majority of the history as she was home with patient when this occurred.  Patient's aunt also provided English and [place]i/Armenian Creole translation as patient is multilingual and speaking both [place]raini and Creole.  According to patient's aunt, patient recently came here from the [place] and is currently taking online classes.  She was in an online class at home today and patient's aunt heard a noise coming from patient's room.  She called to her and asked her what the noise was but patient was not sure.  A little while later, she heard a similar noise so she immediately went to check on patient and patient was found staring at the computer screen but did not seem focused.  A few seconds later she began convulsing and fell to the ground off of the chair.  She did strike the left side of her head per aunt.  Patient convulsed for 30 to 60 seconds and then the seizure stopped.  There was no incontinence, tongue biting or foaming at the mouth per patient's aunt.  She immediately called 911 and per EMS, patient was awake and alert on their arrival and was able to ambulate to the stretcher.  Patient noted to be febrile prehospital with oral temperature 100.9 °F however currently in the ED, on arrival, oral temperature 98.6 °F.  Patient denies any fevers at home.  She does  complain of headache which started after the fall/seizure and EMS also noted some light sensitivity to the sunlight.  Patient denies having a headache prior to the fall/seizure.  Patient also complains of right lower quadrant abdominal pain which started this morning.  Denies any nausea, vomiting, change in bowel habits, UTI symptoms, flank pain, neck or back pain, chest pain, shortness of breath, palpitations, cough or URI symptoms, focal neurologic deficits.  Patient denies being on any blood thinners.  She is taking phenytoin 100 mg twice daily and reports she took her dose last night as well as this morning.  She reports compliance with her medication.  Her last known seizure was approximately 1 month ago and she was evaluated in the ED here at [facility] for that.  She does not currently follow with any local neurologists but was seeing doctors in [place].      History provided by:  Patient, EMS personnel and relative   used: Yes (ED nurse as well as patient's aunt provided English and Indonesian/Estonian Creole translation (patient multilingual).)    Seizure - Prior Hx Of      Review of Systems   Constitutional:  Negative for chills and fever.   HENT:  Negative for congestion, ear pain, rhinorrhea and sore throat.    Eyes:  Positive for photophobia. Negative for pain and visual disturbance.   Respiratory:  Negative for cough, chest tightness, shortness of breath and wheezing.    Cardiovascular:  Negative for chest pain and palpitations.   Gastrointestinal:  Positive for abdominal pain. Negative for abdominal distention, blood in stool, constipation, diarrhea, nausea and vomiting.   Genitourinary:  Negative for dysuria, flank pain, frequency and hematuria.   Musculoskeletal:  Negative for back pain, neck pain and neck stiffness.   Skin:  Negative for color change, pallor, rash and wound.   Allergic/Immunologic: Negative for immunocompromised state.   Neurological:  Positive for  seizures and headaches. Negative for dizziness, syncope, facial asymmetry, speech difficulty, weakness, light-headedness and numbness.   Hematological:  Negative for adenopathy. Does not bruise/bleed easily.   Psychiatric/Behavioral:  Negative for confusion and decreased concentration.    All other systems reviewed and are negative.          Objective       ED Triage Vitals [11/25/24 1247]   Temperature Pulse Blood Pressure Respirations SpO2 Patient Position - Orthostatic VS   98.6 °F (37 °C) 98 130/70 18 100 % Lying      Temp Source Heart Rate Source BP Location FiO2 (%) Pain Score    Oral Monitor Right arm -- 10 - Worst Possible Pain      Vitals      Date and Time Temp Pulse SpO2 Resp BP Pain Score FACES Pain Rating User   11/25/24 1800 99.2 °F (37.3 °C) 92 100 % 17 126/76 3 -- MB   11/25/24 1750 99.2 °F (37.3 °C) 99 100 % 18 126/64 No Pain -- MB   11/25/24 1700 98.6 °F (37 °C) 100 -- 17 127/69 3 -- MB   11/25/24 1630 -- 100 100 % 19 118/72 No Pain -- MB   11/25/24 1600 -- 84 98 % 17 107/57 No Pain -- MB   11/25/24 1530 -- 82 99 % 19 140/72 -- -- AM   11/25/24 1500 -- 70 99 % 20 111/56 -- -- AM   11/25/24 1430 -- 97 100 % 18 143/81 3 -- MB   11/25/24 1400 -- 69 Simultaneous filing. User may not have seen previous data. 100 % Simultaneous filing. User may not have seen previous data. 17 136/75 5 -- MB   11/25/24 1330 -- 82 100 % 17 119/63 10 - Worst Possible Pain -- MB   11/25/24 1300 -- 82 100 % 18 115/64 10 - Worst Possible Pain -- MB   11/25/24 1247 98.6 °F (37 °C) 98 100 % 18 130/70 10 - Worst Possible Pain -- MB          Vitals:    11/25/24 1630 11/25/24 1700 11/25/24 1750 11/25/24 1800   BP: 118/72 127/69 126/64 126/76   BP Location: Right arm  Right arm Right arm   Pulse: 100 100 99 92   Resp: 19 17 18 17   Temp:  98.6 °F (37 °C) 99.2 °F (37.3 °C) 99.2 °F (37.3 °C)   TempSrc: Oral Oral Oral Oral   SpO2: 100%  100% 100%   Weight:  99.6 kg (219 lb 9.3 oz) 99.6 kg (219 lb 9.3 oz) 99.6 kg (219 lb 9.3 oz)         Physical Exam  Vitals and nursing note reviewed.   Constitutional:       General: She is not in acute distress.     Appearance: Normal appearance. She is well-developed. She is not ill-appearing, toxic-appearing or diaphoretic.   HENT:      Head: Normocephalic.      Comments: Small left forehead hematoma.  No skin laceration.     Right Ear: External ear normal.      Left Ear: External ear normal.      Nose: Nose normal.      Mouth/Throat:      Mouth: Mucous membranes are moist.      Pharynx: Oropharynx is clear. No oropharyngeal exudate.   Eyes:      Extraocular Movements: Extraocular movements intact.      Conjunctiva/sclera: Conjunctivae normal.      Pupils: Pupils are equal, round, and reactive to light.   Neck:      Vascular: No JVD.   Cardiovascular:      Rate and Rhythm: Normal rate and regular rhythm.      Pulses: Normal pulses.      Heart sounds: Normal heart sounds. No murmur heard.     No friction rub. No gallop.   Pulmonary:      Effort: Pulmonary effort is normal. No respiratory distress.      Breath sounds: Normal breath sounds. No wheezing, rhonchi or rales.   Abdominal:      General: There is no distension.      Palpations: Abdomen is soft.      Tenderness: There is abdominal tenderness. There is no guarding or rebound.      Comments: Diffuse abdominal tenderness most significant in the right upper and lower quadrants as well as the left lower quadrant.   Musculoskeletal:         General: No swelling or tenderness. Normal range of motion.      Cervical back: Normal range of motion and neck supple. No rigidity or tenderness.   Skin:     General: Skin is warm and dry.      Coloration: Skin is not pale.      Findings: No erythema or rash.   Neurological:      General: No focal deficit present.      Mental Status: She is alert and oriented to person, place, and time.      Cranial Nerves: No cranial nerve deficit.      Sensory: No sensory deficit.      Motor: No weakness.   Psychiatric:         Mood  and Affect: Mood normal.         Behavior: Behavior normal.         Results Reviewed       Procedure Component Value Units Date/Time    Procalcitonin [[id number]]  (Normal) Collected: 11/25/24 1734    Lab Status: Final result Specimen: Blood from Arm, Left Updated: 11/25/24 1818     Procalcitonin <0.05 ng/ml     Lactic acid, plasma (w/reflex if result > 2.0) [[id number]]  (Normal) Collected: 11/25/24 1734    Lab Status: Final result Specimen: Blood from Arm, Right Updated: 11/25/24 1812     LACTIC ACID 1.2 mmol/L     Narrative:      Result may be elevated if tourniquet was used during collection.    Blood culture #1 [[id number]] Collected: 11/25/24 1734    Lab Status: In process Specimen: Blood from Hand, Right Updated: 11/25/24 1738    Blood culture #2 [[id number]] Collected: 11/25/24 1734    Lab Status: In process Specimen: Blood from Arm, Right Updated: 11/25/24 1738    Rapid drug screen, urine [[id number]]  (Normal) Collected: 11/25/24 1436    Lab Status: Final result Specimen: Urine, Clean Catch Updated: 11/25/24 1523     Amph/Meth UR Negative     Barbiturate Ur Negative     Benzodiazepine Urine Negative     Cocaine Urine Negative     Methadone Urine Negative     Opiate Urine Negative     PCP Ur Negative     THC Urine Negative     Oxycodone Urine Negative     Fentanyl Urine Negative     HYDROCODONE URINE Negative    Narrative:      FOR MEDICAL PURPOSES ONLY.   IF CONFIRMATION NEEDED PLEASE CONTACT THE LAB WITHIN 5 DAYS.    Drug Screen Cutoff Levels:  AMPHETAMINE/METHAMPHETAMINES  1000 ng/mL  BARBITURATES     200 ng/mL  BENZODIAZEPINES     200 ng/mL  COCAINE      300 ng/mL  METHADONE      300 ng/mL  OPIATES      300 ng/mL  PHENCYCLIDINE     25 ng/mL  THC       50 ng/mL  OXYCODONE      100 ng/mL  FENTANYL      5 ng/mL  HYDROCODONE     300 ng/mL    UA (URINE) with reflex to Scope [[id number]] Collected: 11/25/24 1436    Lab Status: Final result Specimen: Urine, Clean Catch Updated: 11/25/24 1501     Color, UA Colorless      Clarity, UA Clear     Specific Gravity, UA 1.014     pH, UA 7.5     Leukocytes, UA Negative     Nitrite, UA Negative     Protein, UA Negative mg/dl      Glucose, UA Negative mg/dl      Ketones, UA Negative mg/dl      Urobilinogen, UA <2.0 mg/dl      Bilirubin, UA Negative     Occult Blood, UA Negative    Manual Differential(PHLEBS Do Not Order) [[id number]]  (Abnormal) Collected: 11/25/24 1331    Lab Status: Final result Specimen: Blood from Arm, Right Updated: 11/25/24 1423     Segmented % 70 %      Bands % 2 %      Lymphocytes % 25 %      Monocytes % 2 %      Eosinophils % 0 %      Basophils % 1 %      Absolute Neutrophils 5.09 Thousand/uL      Absolute Lymphocytes 1.77 Thousand/uL      Absolute Monocytes 0.14 Thousand/uL      Absolute Eosinophils 0.00 Thousand/uL      Absolute Basophils 0.07 Thousand/uL      Total Counted --     RBC Morphology Present     Platelet Estimate Adequate     Anisocytosis Present     Hypochromia Present     Microcytes Present     Target Cells Present    Phenytoin level, total [[id number]]  (Abnormal) Collected: 11/25/24 1331    Lab Status: Final result Specimen: Blood from Arm, Right Updated: 11/25/24 1419     Phenytoin Lvl <2.5 ug/mL     hCG, qualitative pregnancy [[id number]]  (Normal) Collected: 11/25/24 1331    Lab Status: Final result Specimen: Blood from Arm, Right Updated: 11/25/24 1407     Preg, Serum Negative    Basic metabolic panel [[id number]] Collected: 11/25/24 1331    Lab Status: Final result Specimen: Blood from Arm, Right Updated: 11/25/24 1402     Sodium 135 mmol/L      Potassium 3.8 mmol/L      Chloride 106 mmol/L      CO2 22 mmol/L      ANION GAP 7 mmol/L      BUN 6 mg/dL      Creatinine 0.78 mg/dL      Glucose 122 mg/dL      Calcium 8.6 mg/dL      eGFR 96 ml/min/1.73sq m     Narrative:      National Kidney Disease Foundation guidelines for Chronic Kidney Disease (CKD):     Stage 1 with normal or high GFR (GFR > 90 mL/min/1.73 square meters)    Stage 2 Mild CKD (GFR  = 60-89 mL/min/1.73 square meters)    Stage 3A Moderate CKD (GFR = 45-59 mL/min/1.73 square meters)    Stage 3B Moderate CKD (GFR = 30-44 mL/min/1.73 square meters)    Stage 4 Severe CKD (GFR = 15-29 mL/min/1.73 square meters)    Stage 5 End Stage CKD (GFR <15 mL/min/1.73 square meters)  Note: GFR calculation is accurate only with a steady state creatinine    Hepatic function panel [[id number]]  (Normal) Collected: 11/25/24 1331    Lab Status: Final result Specimen: Blood from Arm, Right Updated: 11/25/24 1402     Total Bilirubin 0.36 mg/dL      Bilirubin, Direct 0.07 mg/dL      Alkaline Phosphatase 56 U/L      AST 17 U/L      ALT 19 U/L      Total Protein 7.0 g/dL      Albumin 4.1 g/dL     Magnesium [[id number]]  (Normal) Collected: 11/25/24 1331    Lab Status: Final result Specimen: Blood from Arm, Right Updated: 11/25/24 1402     Magnesium 2.0 mg/dL     FLU/COVID Rapid Antigen (30 min. TAT) - Preferred screening test in ED [[id number]]  (Normal) Collected: 11/25/24 1331    Lab Status: Final result Specimen: Nares from Nose Updated: 11/25/24 1400     SARS COV Rapid Antigen Negative     Influenza A Rapid Antigen Negative     Influenza B Rapid Antigen Negative    Narrative:      This test has been performed using the Quidel Amy 2 FLU+SARS Antigen test under the Emergency Use Authorization (EUA). This test has been validated by the  and verified by the performing laboratory. The Amy uses lateral flow immunofluorescent sandwich assay to detect SARS-COV, Influenza A and Influenza B Antigen.     The Quidel Amy 2 SARS Antigen test does not differentiate between SARS-CoV and SARS-CoV-2.     Negative results are presumptive and may be confirmed with a molecular assay, if necessary, for patient management. Negative results do not rule out SARS-CoV-2 or influenza infection and should not be used as the sole basis for treatment or patient management decisions. A negative test result may occur if the level of  antigen in a sample is below the limit of detection of this test.     Positive results are indicative of the presence of viral antigens, but do not rule out bacterial infection or co-infection with other viruses.     All test results should be used as an adjunct to clinical observations and other information available to the provider.    FOR PEDIATRIC PATIENTS - copy/paste COVID Guidelines URL to browser: [email or web address]    CBC and differential [[id number]]  (Abnormal) Collected: 11/25/24 1331    Lab Status: Final result Specimen: Blood from Arm, Right Updated: 11/25/24 1343     WBC 7.07 Thousand/uL      RBC 5.13 Million/uL      Hemoglobin 12.3 g/dL      Hematocrit 36.4 %      MCV 71 fL      MCH 24.0 pg      MCHC 33.8 g/dL      RDW 18.5 %      Platelets 164 Thousands/uL     Narrative:      This is an appended report.  These results have been appended to a previously verified report.            CT head without contrast   Final Interpretation by Parker Glover MD (11/25 1612)      No acute intracranial abnormality.                  Workstation performed: [id number]         CT abdomen pelvis with contrast   Final Interpretation by Brian Song MD (11/25 1617)      The appendiceal wall mildly thickened/indistinct, with adjacent trace right paracolic gutter inflammatory change and several subcentimeter nonpathologically enlarged nodes.   Considering clinical history, early appendicitis suspected.         Finding were discussed with Dr. aEton from the emergency department at 4:15 p.m. on 11/25/2024         Workstation performed: [id number]         CT spine cervical without contrast   Final Interpretation by Parker Glover MD (11/25 9546)      No cervical spine fracture or traumatic malalignment.                  Workstation performed: [id number]         XR chest 1 view portable   Final Interpretation by Jose G Basilio MD (11/25 8799)       No acute cardiopulmonary disease.            Workstation performed: [id number]             ECG 12 Lead Documentation Only    Date/Time: 11/25/2024 12:48 PM    Performed by: Anna Eaton DO  Authorized by: Anna Eaton DO    ECG reviewed by me, the ED Provider: yes    Patient location:  ED  Previous ECG:     Previous ECG:  Compared to current    Comparison ECG info:  10-    Similarity:  No change  Rate:     ECG rate:  97    ECG rate assessment: normal    Rhythm:     Rhythm: sinus rhythm    Ectopy:     Ectopy: none    QRS:     QRS axis:  Normal    QRS intervals:  Normal  Conduction:     Conduction: normal    ST segments:     ST segments:  Normal  T waves:     T waves: normal        ED Medication and Procedure Management   Prior to Admission Medications   Prescriptions Last Dose Informant Patient Reported? Taking?   phenytoin (DILANTIN) 100 mg ER capsule  Self Yes No   Sig: Take 200 mg by mouth daily Patient takes 100 mg in AM and then 100 mg in PM      Facility-Administered Medications: None     Patient's Medications   Discharge Prescriptions    No medications on file     No discharge procedures on file.  ED SEPSIS DOCUMENTATION   Time reflects when diagnosis was documented in both MDM as applicable and the Disposition within this note       Time User Action Codes Description Comment    11/25/2024  5:21 PM Harshad Mcclelland [K35.80] Acute appendicitis, unspecified acute appendicitis type     11/25/2024  5:23 PM Anna Eaton [K35.80] Acute appendicitis     11/25/2024  5:23 PM Anna Eaton [R10.31] Acute right lower quadrant pain     11/25/2024  5:24 PM Anna Eaton [R56.9] Seizures (HCC)                  Anna Eaton DO  11/25/24 1838

## 2024-11-25 NOTE — ASSESSMENT & PLAN NOTE
38y F pw abdominal pain, diarrhea and seizures  Brought to ED via EMS w report of seizure activity at home and witnessed by EMS and again in ED  Abdomen tender in RLQ w guarding  AVSS, but report of temp with EMS on transport, no leukocytosis  Dilantin levels found to be subtherapeutic    CT AP: The appendiceal wall mildly thickened/indistinct, with adjacent trace right paracolic gutter inflammatory change and several subcentimeter nonpathologically enlarged nodes.  Considering clinical history, early appendicitis suspected.    Plan:  Plan for possible OR tomorrow for laparoscopic appendectomy, possible open, if cleared medically. This was discussed with the patient who is agreeable with the plan. Informed consent will be obtained by the surgeon  OK for CLD if tolerating tonight, NPO midnight  IV antibiotics for microbial coverage  Trend labs, monitor WBC  Monitor vitals  Pain control PRN  Serial abdominal exams  IS post-operatively  Ambulation/OOB   DVT prophylaxis   Remainder of management per primary team  SLIM primary  Surgery will continue to follow

## 2024-11-25 NOTE — CONSULTS
Consultation - Surgery-General   Name: Joy Guaman 38 y.o. female I MRN: [id number]  Unit/Bed#: ED 28 I Date of Admission: 11/25/2024   Date of Service: 11/25/2024 I Hospital Day: 0   Consult to surgery general  Consult performed by: Tammy Reyna PA-C  Consult ordered by: Anna Eaton DO      Physician Requesting Evaluation: Sandip Carrasquillo, *   Reason for Evaluation / Principal Problem: Appendicitis    Assessment & Plan  Acute appendicitis with localized peritonitis  38y F pw abdominal pain, diarrhea and seizures  Brought to ED via EMS w report of seizure activity at home and witnessed by EMS and again in ED  Abdomen tender in RLQ w guarding  AVSS, but report of temp with EMS on transport, no leukocytosis  Dilantin levels found to be subtherapeutic    CT AP: The appendiceal wall mildly thickened/indistinct, with adjacent trace right paracolic gutter inflammatory change and several subcentimeter nonpathologically enlarged nodes.  Considering clinical history, early appendicitis suspected.    Plan:  Plan for possible OR tomorrow for laparoscopic appendectomy, possible open, if cleared medically. This was discussed with the patient who is agreeable with the plan. Informed consent will be obtained by the surgeon  OK for CLD if tolerating tonight, NPO midnight  IV antibiotics for microbial coverage  Trend labs, monitor WBC  Monitor vitals  Pain control PRN  Serial abdominal exams  IS post-operatively  Ambulation/OOB   DVT prophylaxis   Remainder of management per primary team  SLIM primary  Surgery will continue to follow    Seizure (HCC)  Presented via EMS after seizure at home with report of fall and possible head strike. Also witnessed again by EMS and then with ED staff  H/o seizures, on Dilantin. Levels found to be subtherapeutic in ED and Neurology consulted.   Treated in ED and patient improved  Pt notes headache and abdominal pain    CT cervical Spine: No cervical spine fracture or  traumatic malalignment.   CT Head: No acute intracranial abnormality.     Plan:  Continue with care per medicine team and neurology  Appreciate recommendations to proceed with surgical intervention for appendicitis  Please contact the SecureChat role for the Surgery-General service with any questions/concerns.    History of Present Illness   Joy Guaman is a 38 y.o. female who presents with abdominal pain, diarrhea, and seizures.  Patient is Telugu-speaking and translation was provided by the  application as well as patient's RN.  Patient was brought via EMS after having a witnessed seizure at home by her aunt.  It was also reported that EMS witnessed seizure-like activity as well as an additional episode in the emergency department.  Patient was noted to have low-grade fever on transport but has been afebrile in the emergency department.  Neurology was consulted and patient giving appropriate medication and treatment and started feeling better but did complain of headache.  Patient also noted abdominal pain which started this morning when she woke up.  She notes is in her lower abdomen and more located to the right side.  She notes some mild nausea and only ate crackers this morning but denies any vomiting or jen nausea.  She did have diarrhea this morning and she is passing flatus.  She states she sometimes gets similar pain with her periods as well as diarrhea but states this pain is more severe.  Pain has not changed throughout the day and nothing has made it better.  She denies any subjective fevers or chills but notes that she has felt unwell today.  She has surgical history of 2  sections.  No other abdominal surgical history.    Review of Systems   Constitutional:  Positive for appetite change. Negative for chills, diaphoresis and fever.   HENT:  Negative for congestion, ear pain and sore throat.    Eyes:  Negative for pain and visual disturbance.   Respiratory:  Negative for  cough, shortness of breath and wheezing.    Cardiovascular:  Negative for chest pain and palpitations.   Gastrointestinal:  Positive for abdominal pain and diarrhea. Negative for constipation, nausea and vomiting.   Genitourinary:  Negative for difficulty urinating, dysuria and hematuria.   Musculoskeletal:  Negative for arthralgias and back pain.   Skin:  Negative for color change and rash.   Neurological:  Positive for seizures and headaches. Negative for syncope, weakness, light-headedness and numbness.   All other systems reviewed and are negative.    I have reviewed the patient's PMH, PSH, Social History, Family History, Meds, and Allergies  Historical Information   Past Medical History:   Diagnosis Date    Seizure (HCC)      No past surgical history on file.  Social History     Tobacco Use    Smoking status: Never    Smokeless tobacco: Never   Substance and Sexual Activity    Alcohol use: Never    Drug use: Never    Sexual activity: Not on file     E-Cigarette/Vaping     E-Cigarette/Vaping Substances     Family history non-contributory  Social History     Tobacco Use    Smoking status: Never    Smokeless tobacco: Never   Substance and Sexual Activity    Alcohol use: Never    Drug use: Never    Sexual activity: Not on file     Ibuprofen    Objective :  Temp:  [98.6 °F (37 °C)-99.2 °F (37.3 °C)] 99.2 °F (37.3 °C)  HR:  [] 99  BP: (107-143)/(56-81) 126/64  Resp:  [17-20] 18  SpO2:  [98 %-100 %] 100 %  O2 Device: None (Room air)      Physical Exam  Vitals and nursing note reviewed.   Constitutional:       General: She is not in acute distress.     Appearance: She is well-developed. She is not ill-appearing or toxic-appearing.   HENT:      Head: Normocephalic and atraumatic.      Nose: Nose normal.      Mouth/Throat:      Mouth: Mucous membranes are moist.      Pharynx: No oropharyngeal exudate.   Eyes:      General: No scleral icterus.     Conjunctiva/sclera: Conjunctivae normal.   Cardiovascular:      Rate  and Rhythm: Normal rate and regular rhythm.      Pulses: Normal pulses.      Heart sounds: No murmur heard.  Pulmonary:      Effort: Pulmonary effort is normal. No respiratory distress.      Breath sounds: Normal breath sounds. No wheezing.   Abdominal:      General: Bowel sounds are normal. There is no distension.      Palpations: Abdomen is soft.      Tenderness: There is abdominal tenderness (suprapubic and RLQ). There is guarding.   Musculoskeletal:         General: No swelling.      Cervical back: Neck supple.      Right lower leg: No edema.      Left lower leg: No edema.   Skin:     General: Skin is warm and dry.      Capillary Refill: Capillary refill takes less than 2 seconds.      Coloration: Skin is not jaundiced.   Neurological:      General: No focal deficit present.      Mental Status: She is alert and oriented to person, place, and time.   Psychiatric:         Mood and Affect: Mood normal.         Lab Results: I have reviewed the following results:  Recent Labs     11/25/24  1331   WBC 7.07   HGB 12.3   HCT 36.4      BANDSPCT 2   SODIUM 135   K 3.8      CO2 22   BUN 6   CREATININE 0.78   GLUC 122   MG 2.0   AST 17   ALT 19   ALB 4.1   TBILI 0.36   ALKPHOS 56       Imaging Results Review: No pertinent imaging studies reviewed.  Other Study Results Review: No additional pertinent studies reviewed.    VTE Pharmacologic Prophylaxis: VTE covered by:    None     VTE Mechanical Prophylaxis: sequential compression device    Tammy Reyna  11/25/2024

## 2024-11-26 LAB
ANION GAP SERPL CALCULATED.3IONS-SCNC: 8 MMOL/L (ref 4–13)
ANISOCYTOSIS BLD QL SMEAR: PRESENT
BASOPHILS # BLD MANUAL: 0.08 THOUSAND/UL (ref 0–0.1)
BASOPHILS NFR MAR MANUAL: 1 % (ref 0–1)
BUN SERPL-MCNC: 4 MG/DL (ref 5–25)
CALCIUM SERPL-MCNC: 8.6 MG/DL (ref 8.4–10.2)
CHLORIDE SERPL-SCNC: 108 MMOL/L (ref 96–108)
CO2 SERPL-SCNC: 23 MMOL/L (ref 21–32)
CREAT SERPL-MCNC: 0.73 MG/DL (ref 0.6–1.3)
EOSINOPHIL # BLD MANUAL: 0.08 THOUSAND/UL (ref 0–0.4)
EOSINOPHIL NFR BLD MANUAL: 1 % (ref 0–6)
ERYTHROCYTE [DISTWIDTH] IN BLOOD BY AUTOMATED COUNT: 18.4 % (ref 11.6–15.1)
GFR SERPL CREATININE-BSD FRML MDRD: 104 ML/MIN/1.73SQ M
GLUCOSE SERPL-MCNC: 85 MG/DL (ref 65–140)
HCT VFR BLD AUTO: 37.6 % (ref 34.8–46.1)
HGB BLD-MCNC: 12.3 G/DL (ref 11.5–15.4)
HYPERCHROMIA BLD QL SMEAR: PRESENT
LYMPHOCYTES # BLD AUTO: 2.76 THOUSAND/UL (ref 0.6–4.47)
LYMPHOCYTES # BLD AUTO: 22 % (ref 14–44)
MAGNESIUM SERPL-MCNC: 2 MG/DL (ref 1.9–2.7)
MCH RBC QN AUTO: 23.7 PG (ref 26.8–34.3)
MCHC RBC AUTO-ENTMCNC: 32.7 G/DL (ref 31.4–37.4)
MCV RBC AUTO: 72 FL (ref 82–98)
MICROCYTES BLD QL AUTO: PRESENT
MONOCYTES # BLD AUTO: 1.08 THOUSAND/UL (ref 0–1.22)
MONOCYTES NFR BLD: 14 % (ref 4–12)
NEUTROPHILS # BLD MANUAL: 3.69 THOUSAND/UL (ref 1.85–7.62)
NEUTS SEG NFR BLD AUTO: 48 % (ref 43–75)
PLATELET # BLD AUTO: 172 THOUSANDS/UL (ref 149–390)
PLATELET BLD QL SMEAR: ADEQUATE
POTASSIUM SERPL-SCNC: 3.8 MMOL/L (ref 3.5–5.3)
RBC # BLD AUTO: 5.19 MILLION/UL (ref 3.81–5.12)
RBC MORPH BLD: PRESENT
SODIUM SERPL-SCNC: 139 MMOL/L (ref 135–147)
VARIANT LYMPHS # BLD AUTO: 14 %
WBC # BLD AUTO: 7.68 THOUSAND/UL (ref 4.31–10.16)

## 2024-11-26 PROCEDURE — 83735 ASSAY OF MAGNESIUM: CPT | Performed by: STUDENT IN AN ORGANIZED HEALTH CARE EDUCATION/TRAINING PROGRAM

## 2024-11-26 PROCEDURE — 85027 COMPLETE CBC AUTOMATED: CPT | Performed by: STUDENT IN AN ORGANIZED HEALTH CARE EDUCATION/TRAINING PROGRAM

## 2024-11-26 PROCEDURE — 80048 BASIC METABOLIC PNL TOTAL CA: CPT | Performed by: STUDENT IN AN ORGANIZED HEALTH CARE EDUCATION/TRAINING PROGRAM

## 2024-11-26 PROCEDURE — 85007 BL SMEAR W/DIFF WBC COUNT: CPT | Performed by: STUDENT IN AN ORGANIZED HEALTH CARE EDUCATION/TRAINING PROGRAM

## 2024-11-26 PROCEDURE — 99232 SBSQ HOSP IP/OBS MODERATE 35: CPT | Performed by: PHYSICIAN ASSISTANT

## 2024-11-26 RX ORDER — PHENYTOIN SODIUM 100 MG/1
100 CAPSULE, EXTENDED RELEASE ORAL DAILY
Status: DISCONTINUED | OUTPATIENT
Start: 2024-11-26 | End: 2024-11-27

## 2024-11-26 RX ORDER — DEXTROSE MONOHYDRATE, SODIUM CHLORIDE, AND POTASSIUM CHLORIDE 50; 1.49; 4.5 G/1000ML; G/1000ML; G/1000ML
100 INJECTION, SOLUTION INTRAVENOUS CONTINUOUS
Status: DISCONTINUED | OUTPATIENT
Start: 2024-11-26 | End: 2024-11-26

## 2024-11-26 RX ORDER — KETOROLAC TROMETHAMINE 30 MG/ML
30 INJECTION, SOLUTION INTRAMUSCULAR; INTRAVENOUS EVERY 6 HOURS PRN
Status: DISCONTINUED | OUTPATIENT
Start: 2024-11-26 | End: 2024-11-28 | Stop reason: HOSPADM

## 2024-11-26 RX ORDER — SODIUM CHLORIDE 9 MG/ML
100 INJECTION, SOLUTION INTRAVENOUS CONTINUOUS
Status: DISCONTINUED | OUTPATIENT
Start: 2024-11-26 | End: 2024-11-28 | Stop reason: HOSPADM

## 2024-11-26 RX ORDER — ENOXAPARIN SODIUM 100 MG/ML
40 INJECTION SUBCUTANEOUS DAILY
Status: DISCONTINUED | OUTPATIENT
Start: 2024-11-26 | End: 2024-11-28 | Stop reason: HOSPADM

## 2024-11-26 RX ORDER — MULTIVIT WITH MINERALS/LUTEIN
1000 TABLET ORAL DAILY
COMMUNITY

## 2024-11-26 RX ORDER — ACETAMINOPHEN 325 MG/1
650 TABLET ORAL EVERY 6 HOURS PRN
Status: DISCONTINUED | OUTPATIENT
Start: 2024-11-26 | End: 2024-11-28 | Stop reason: HOSPADM

## 2024-11-26 RX ORDER — METRONIDAZOLE 500 MG/100ML
500 INJECTION, SOLUTION INTRAVENOUS EVERY 12 HOURS
Status: DISCONTINUED | OUTPATIENT
Start: 2024-11-26 | End: 2024-11-28 | Stop reason: HOSPADM

## 2024-11-26 RX ADMIN — METRONIDAZOLE 500 MG: 500 INJECTION, SOLUTION INTRAVENOUS at 20:30

## 2024-11-26 RX ADMIN — PHENYTOIN SODIUM 100 MG: 100 CAPSULE ORAL at 09:23

## 2024-11-26 RX ADMIN — SODIUM CHLORIDE 100 ML/HR: 0.9 INJECTION, SOLUTION INTRAVENOUS at 16:00

## 2024-11-26 RX ADMIN — METRONIDAZOLE 500 MG: 500 INJECTION, SOLUTION INTRAVENOUS at 10:12

## 2024-11-26 RX ADMIN — ACETAMINOPHEN 650 MG: 325 TABLET, FILM COATED ORAL at 10:30

## 2024-11-26 RX ADMIN — CEFTRIAXONE SODIUM 1000 MG: 10 INJECTION, POWDER, FOR SOLUTION INTRAVENOUS at 12:41

## 2024-11-26 RX ADMIN — ENOXAPARIN SODIUM 40 MG: 40 INJECTION SUBCUTANEOUS at 10:24

## 2024-11-26 RX ADMIN — DEXTROSE, SODIUM CHLORIDE, AND POTASSIUM CHLORIDE 100 ML/HR: 5; .45; .15 INJECTION INTRAVENOUS at 10:17

## 2024-11-26 RX ADMIN — METRONIDAZOLE 500 MG: 500 INJECTION, SOLUTION INTRAVENOUS at 01:27

## 2024-11-26 NOTE — QUICK NOTE
I saw the patient at 830 this morning    Patient was sleeping comfortably and did not appear to be in pain.  I used a .    Patient states that she came to the emergency room yesterday because of a seizure.  She states that she has been taking her medicines regularly although her Dilantin levels were negligible.    She also states that since yesterday she has been having lower abdominal pain.  She denied any nausea or vomiting.  This morning her pain is much improved.  She is feeling hungry.    She has a Tmax of 99 last night.    Abdominal exam reveals mild lower abdominal tenderness not specifically localizing to the right lower quadrant.  There is no guarding, rebound or rigidity.    WBC count has been normal on 2 occasions    I reviewed the CT scan films.  There is changes of very mild appendicitis.    Plan:  We will continue with conservative therapy with IV antibiotics and do follow-up exams.  In my opinion she will settle down with nonoperative treatment.

## 2024-11-26 NOTE — ASSESSMENT & PLAN NOTE
Known seizure disorder; 1 seizure PTA to [facility]; 1 witnessed seizure in ED  Phenytoin level <2.5   Neurology following; appreciate continued recs  EEG pending  S/p fosphenytoin 1500 mg IV x1 11/25   C/w phenytoin  mg QD   Seizure precautions

## 2024-11-26 NOTE — ASSESSMENT & PLAN NOTE
Known seizure disorder, 1 prior seizure prior to presenting to the emergency room, 2 brief seizures witnessed by nurse in the ED.  Neurology spoke to emergency room team, recommended fosphenytoin load  Check EEG  Will await further recommendations  Likely need to be discharged on standing antiepileptic medications

## 2024-11-26 NOTE — ASSESSMENT & PLAN NOTE
Appreciate surgery recommendations  N.p.o. past midnight for possible or tomorrow, pending seizure workup  Continue ceftriaxone and Flagyl  Leukocytosis, a febrile, hemodynamically stable

## 2024-11-26 NOTE — CONSULTS
"Consultation - Neurology   Name: Joy Guaman 39 y.o. female I MRN: [id number]  Unit/Bed#: -01 I Date of Admission: 11/25/2024   Date of Service: 11/26/2024 I Hospital Day: 1   Inpatient consult to Neurology  Consult performed by: Alicia Wyatt PA-C  Consult ordered by: Sandip Carrasquillo DO      Physician Requesting Evaluation: Ghassan Sotomayor MD   Reason for Evaluation / Principal Problem: seizure like activity     Assessment & Plan  Seizure (HCC)  39 y.o.  female with reported seizure history on phenytoin,  who presented to [facility] on 11/25/24 with reported seizure like activity.  Patient reportedly compliant with phenytoin at home but phenytoin is from [place].  Phenytoin level in ED less than 2.5.    Workup  - CTH wo contrast 11/25/24:  \"No acute intracranial abnormality.\"  - Labs  - phenytoin level <2.5     Seizure in setting of low phenytoin level and/or acute appendicitis.    Plan  - s/p fosphenytoin 1500 mg IV x1 11/25  - continue phenytoin  mg QD   - Medical management and correction of metabolic and infectious disturbances per primary team   - Avoid CNS altering medications if possible  - Continue supportive care   - Continue to monitor neurologic status. Notify neurology with any changes in exam.       Acute appendicitis with localized peritonitis    Case and plan discussed with attending neurologist.  Please see attending attestation for any further recommendations and/or changes to plan.        Joy Guaman will need follow-up in in 6 weeks with Dr. Donna Davis for Other in 60 minute appointment. They will not require outpatient neurological testing.      History of Present Illness   Joy Guaman is a 39 y.o.  female with reported seizure history on phenytoin,  who presented to [facility] on 11/25/24 with reported seizure like activity.     Per chart review: Patient is from the [place] is currently taking online classes.  Will patient was in an online " class, patient is interacting with coming from patient's room.  A while later, she heard a similar noise and went to check on the patient.  Patient's and found patient staring at the computer screen but did not seem focused.  A few seconds later, patient reportedly began convulsing and fell off the chair onto the ground.  Patient reportedly convulsed for 30 to 60 seconds and then ceased without intervention.  No incontinence, no tongue bite, no foaming at the mouth.  The patient then called EMS mask.  When EMS arrived, patient alert and awake.  Patient reportedly taking phenytoin 100 mg twice daily and reports compliance with medication; however, medication is from the [place].  Patient does not have a neurologist locally, only in [place].  Phenytoin level in ED less than 2.5.  Patient reportedly with another convulsive seizure-like activity in the ED lasting less than 1 minute.  When provider went to assess patient, patient was sleeping comfortably, easily arousable, answering questions appropriately, and did not appear postictal.  ED reached out to on-call neurologist who recommended fosphenytoin 10 to 15 mg/kg IV in the ED for loading dose and then to represcribe phenytoin 100 mg extended release once daily.  CT head in ED unrevealing.  CT abdomen pelvis with contrast showing acute appendicitis.    Utilized  Tania [id number] for the following history from pt:   Patient reports she remembers taking her online class yesterday.  Patient remembers EMS coming and her being in the ambulance.  Patient reports she has had seizures in the past and has been diagnosed with epilepsy in the [place].  Patient reports that she is on a medication, but is not sure which medication, from the [place] to prevent her from having seizures.  Patient reports her bottle of AED medication is at home with her aunt.  Patient reports she takes the medication morning and night  every day without any missed doses.  Patient is not sure what her seizures usually look like.  Patient denies alcohol use.  Patient denies illicit drug use.  Patient denies tobacco use.    Per discussion with the patient's nurse, no report of seizure-like events overnight.  No seizure-like activity this morning.    Review of Systems   Neurological:  Negative for headaches.        I have reviewed the patient's PMH, PSH, Social History, Family History, Meds, and Allergies  Historical Information   Past Medical History:   Diagnosis Date    Seizure (HCC)      No past surgical history on file.  Social History     Tobacco Use    Smoking status: Never    Smokeless tobacco: Never   Substance and Sexual Activity    Alcohol use: Never    Drug use: Never    Sexual activity: Not on file     E-Cigarette/Vaping     E-Cigarette/Vaping Substances     No family history on file.  Social History     Tobacco Use    Smoking status: Never    Smokeless tobacco: Never   Substance and Sexual Activity    Alcohol use: Never    Drug use: Never    Sexual activity: Not on file       Current Facility-Administered Medications:     metroNIDAZOLE (FLAGYL) IVPB (premix) 500 mg 100 mL, Q8H, Last Rate: 500 mg (11/26/24 0127)  Prior to Admission Medications   Prescriptions Last Dose Informant Patient Reported? Taking?   phenytoin (DILANTIN) 100 mg ER capsule  Self Yes No   Sig: Take 200 mg by mouth daily Patient takes 100 mg in AM and then 100 mg in PM      Facility-Administered Medications: None     Ibuprofen    Objective :  Temp:  [98.6 °F (37 °C)-100.7 °F (38.2 °C)] 99.3 °F (37.4 °C)  HR:  [] 82  BP: (107-143)/(56-97) 141/81  Resp:  [17-20] 18  SpO2:  [98 %-100 %] 99 %  O2 Device: None (Room air)    Physical Exam  Vitals and nursing note reviewed.   HENT:      Head: Normocephalic and atraumatic.   Eyes:      General: No scleral icterus.        Right eye: No discharge.         Left eye: No discharge.      Extraocular Movements: Extraocular  movements intact. No nystagmus.      Conjunctiva/sclera: Conjunctivae normal.   Cardiovascular:      Rate and Rhythm: Normal rate.   Pulmonary:      Effort: Pulmonary effort is normal.   Neurological:      Mental Status: She is alert.      Cranial Nerves: No dysarthria.     Neurological Exam  Mental Status  Alert. Oriented to person, place and time. no dysarthria present. Able to name objects and repeat. Follows two-step commands. Language: Speech fluent.  Thought content appropriate..    Cranial Nerves  CN II: Visual fields full to confrontation.  CN III, IV, VI: Extraocular movements intact bilaterally. No nystagmus.  CN V: Facial sensation is normal.  CN VII: Full and symmetric facial movement.  CN XII: Tongue midline without atrophy or fasciculations.  - Hearing grossly intact.    Motor                                               Right                     Left   Shoulder abduction               5                          5  Elbow flexion                         5                          5  Elbow extension                    5                          5  Finger flexion                         5                          5  Hip flexion                              5                          5  Plantarflexion                         5                          5  Dorsiflexion                            5                          5    Sensory  Light touch is normal in upper and lower extremities.  Right extinction absent: Left extinction absent:    Coordination  Right: Finger-to-nose normal.Left: Finger-to-nose normal.        Lab Results: I have reviewed the following results:I have personally reviewed pertinent reports.  , CBC:   Results from last 7 days   Lab Units 11/25/24  1331   WBC Thousand/uL 7.07   RBC Million/uL 5.13*   HEMOGLOBIN g/dL 12.3   HEMATOCRIT % 36.4   MCV fL 71*   PLATELETS Thousands/uL 164   , BMP/CMP:   Results from last 7 days   Lab Units 11/26/24  0623 11/25/24  1331   SODIUM mmol/L 139 135    POTASSIUM mmol/L 3.8 3.8   CHLORIDE mmol/L 108 106   CO2 mmol/L 23 22   BUN mg/dL 4* 6   CREATININE mg/dL 0.73 0.78   CALCIUM mg/dL 8.6 8.6   AST U/L  --  17   ALT U/L  --  19   ALK PHOS U/L  --  56   EGFR ml/min/1.73sq m 104 96   , Vitamin B12:   , HgBA1C:   , TSH:   , Coagulation:   , Lipid Profile:   , Ammonia:   , Urinalysis:   Results from last 7 days   Lab Units 11/25/24  1436   COLOR UA  Colorless   CLARITY UA  Clear   SPEC GRAV UA  1.014   PH UA  7.5   LEUKOCYTES UA  Negative   NITRITE UA  Negative   GLUCOSE UA mg/dl Negative   KETONES UA mg/dl Negative   BILIRUBIN UA  Negative   BLOOD UA  Negative   , Drug Screen:   Results from last 7 days   Lab Units 11/25/24  1436   BARBITURATE UR  Negative   BENZODIAZEPINE UR  Negative   THC UR  Negative   COCAINE UR  Negative   METHADONE URINE  Negative   OPIATE UR  Negative   PCP UR  Negative   , Medication Drug Levels:   Results from last 7 days   Lab Units 11/25/24  1331   PHENYTOIN LVL ug/mL <2.5*     Recent Labs     11/25/24  1331 11/26/24  0623   WBC 7.07  --    HGB 12.3  --    HCT 36.4  --      --    BANDSPCT 2  --    SODIUM 135 139   K 3.8 3.8    108   CO2 22 23   BUN 6 4*   CREATININE 0.78 0.73   GLUC 122 85   MG 2.0 2.0     Imaging Results Review: I personally reviewed the following image studies in PACS and associated radiology reports: CT head. My interpretation of the radiology images/reports is: no acute intracranial abnormality.  Other Study Results Review: EKG was reviewed.     This note was completed in part utilizing Dragon Software.  Grammatical errors, random word insertions, spelling mistakes, and incomplete sentences may be an occasional consequence of this system secondary to software limitations, ambient noise, and hardware issues.  If you have any questions or concerns about the content, text, or information contained within the body of this dictation, please contact the provider for clarification.

## 2024-11-26 NOTE — PROGRESS NOTES
Progress Note - Surgery-General   Name: Joy Guaman 39 y.o. female I MRN: [id number]  Unit/Bed#: -01 I Date of Admission: 11/25/2024   Date of Service: 11/26/2024 I Hospital Day: 1    Assessment & Plan  Acute appendicitis with localized peritonitis  38y F pw abdominal pain, diarrhea and seizures  Brought to ED via EMS w report of seizure activity at home and witnessed by EMS and again in ED  Abdomen tender in RLQ w guarding  Dilantin levels found to be subtherapeutic  Afebrile, Tmax 100.7 745pm 11/25, VSS, no leukocytosis  Abdomen    CT AP: The appendiceal wall mildly thickened/indistinct, with adjacent trace right paracolic gutter inflammatory change and several subcentimeter nonpathologically enlarged nodes.  Considering clinical history, early appendicitis suspected.    Plan:  Patient is improving clinically with antibiotics. She is hemodynamically stable and abdominal pain has improved since last night. Will plan to treat conservatively with antibiotics as this time and continue serial abdominal exams and monitor toleration of diet.  Advance diet as tolerated  IV antibiotics for microbial coverage, plan to transition to oral on discharge  Check CBC w diff in the AM  Monitor vitals  Pain control PRN  Ambulation/OOB   DVT prophylaxis   Remainder of management per primary team, SLIM primary  Surgery will continue to follow    Seizure (HCC)  Known seizure disorder  Presented via EMS after seizure at home with report of fall and possible head strike. Also witnessed again by EMS and then with ED staff  H/o seizures, on Dilantin. Levels found to be subtherapeutic in ED and Neurology consulted.   Treated in ED and patient improved    CT cervical Spine: No cervical spine fracture or traumatic malalignment.   CT Head: No acute intracranial abnormality.     Plan:  Continue with care per medicine team and neurology    Please contact the SecureChat role for the Surgery-General service with any  questions/concerns.    Subjective   Feeling much better today. Denies nausea or vomiting. Feeling hungry. Pain improved from 10 on presentation to 1 or 2. Denies subjective fevers or chills.    Objective :  Temp:  [98.6 °F (37 °C)-100.7 °F (38.2 °C)] 99.3 °F (37.4 °C)  HR:  [] 82  BP: (107-143)/(56-97) 141/81  Resp:  [17-20] 18  SpO2:  [98 %-100 %] 99 %  O2 Device: None (Room air)    I/O         11/24 0701  11/25 0700 11/25 0701  11/26 0700 11/26 0701  11/27 0700    P.O.   0    IV Piggyback  2000     Total Intake(mL/kg)  2000 (21.4) 0 (0)    Urine (mL/kg/hr)  1750 300 (0.7)    Total Output  1750 300    Net  +250 -300                   Physical Exam  Vitals and nursing note reviewed.   Constitutional:       General: She is not in acute distress.     Appearance: She is well-developed.   HENT:      Head: Normocephalic and atraumatic.      Nose: Nose normal. No congestion.      Mouth/Throat:      Mouth: Mucous membranes are moist.   Eyes:      Conjunctiva/sclera: Conjunctivae normal.   Cardiovascular:      Rate and Rhythm: Normal rate and regular rhythm.      Heart sounds: No murmur heard.  Pulmonary:      Effort: Pulmonary effort is normal. No respiratory distress.      Breath sounds: Normal breath sounds.   Abdominal:      General: Bowel sounds are normal. There is no distension.      Palpations: Abdomen is soft. There is no mass.      Tenderness: There is abdominal tenderness (mild RLQ tenderness). There is no guarding.   Musculoskeletal:         General: No swelling.      Cervical back: Neck supple.      Right lower leg: No edema.      Left lower leg: No edema.   Skin:     General: Skin is warm and dry.      Capillary Refill: Capillary refill takes less than 2 seconds.      Coloration: Skin is not jaundiced.   Neurological:      General: No focal deficit present.      Mental Status: She is alert and oriented to person, place, and time.   Psychiatric:         Mood and Affect: Mood normal.         Lab Results: I  have reviewed the following results:  Recent Labs     11/25/24  1331 11/25/24  1734 11/26/24  0623   WBC 7.07  --  7.68   HGB 12.3  --  12.3   HCT 36.4  --  37.6     --  172   BANDSPCT 2  --   --    SODIUM 135  --  139   K 3.8  --  3.8     --  108   CO2 22  --  23   BUN 6  --  4*   CREATININE 0.78  --  0.73   GLUC 122  --  85   MG 2.0  --  2.0   AST 17  --   --    ALT 19  --   --    ALB 4.1  --   --    TBILI 0.36  --   --    ALKPHOS 56  --   --    LACTICACID  --  1.2  --        Imaging Results Review: No pertinent imaging studies reviewed.  Other Study Results Review: No additional pertinent studies reviewed.    VTE Pharmacologic Prophylaxis: VTE covered by:  enoxaparin, Subcutaneous, 40 mg at 11/26/24 1024     VTE Mechanical Prophylaxis: sequential compression device    Tammy Reyna  11/26/2024

## 2024-11-26 NOTE — H&P
H&P - Hospitalist   Name: Joy Guaman 38 y.o. female I MRN: [id number]  Unit/Bed#: ED 28 I Date of Admission: 11/25/2024   Date of Service: 11/25/2024 I Hospital Day: 0     Assessment & Plan  Acute appendicitis with localized peritonitis  Appreciate surgery recommendations  N.p.o. past midnight for possible or tomorrow, pending seizure workup  Continue ceftriaxone and Flagyl  Leukocytosis, a febrile, hemodynamically stable  Seizure (HCC)  Known seizure disorder, 1 prior seizure prior to presenting to the emergency room, 2 brief seizures witnessed by nurse in the ED.  Neurology spoke to emergency room team, recommended fosphenytoin load  Check EEG  Will await further recommendations  Likely need to be discharged on standing antiepileptic medications      VTE Pharmacologic Prophylaxis:   Moderate Risk (Score 3-4) - Pharmacological DVT Prophylaxis Ordered: heparin.  Code Status: No Order full  Discussion with family: dw patient's uncle at bedside    Anticipated Length of Stay: Patient will be admitted on an inpatient basis with an anticipated length of stay of greater than 2 midnights secondary to appendicitis.    History of Present Illness   Chief Complaint: seizures, appendicitis    Joy Guaman is a 38 y.o. female with a PMH of seizure disorder who presents with acute appendicitis and seizures.    Patient's uncle was at bedside who helped provide translation.  She also complaining of right lower quadrant pain which CT demonstrated thickened appendiceal wall and subsequently surgery was consulted who is planning for appendectomy once medically stable from a seizure standpoint.    On my evaluation she endorses ongoing right lower quadrant abdominal pain which is slightly improved.  He has no recollection of the prior seizures from today and is unable to provide history from that standpoint.  She believes that her last seizure was 4 years ago following the birth of her child.    Per the ER team, they spoke  with neurology who recommended fosphenytoin load.  The patient is not currently taking fosphenytoin at home currently reports no medications being used.    Review of Systems    Historical Information   Past Medical History:   Diagnosis Date    Seizure (HCC)      No past surgical history on file.  Social History     Tobacco Use    Smoking status: Never    Smokeless tobacco: Never   Substance and Sexual Activity    Alcohol use: Never    Drug use: Never    Sexual activity: Not on file     E-Cigarette/Vaping     E-Cigarette/Vaping Substances     Family history non-contributory  Social History:  Marital Status: Single   Occupation: Currently not working  Patient Pre-hospital Living Situation: Home  Patient Pre-hospital Level of Mobility: walks  Patient Pre-hospital Diet Restrictions: None    Meds/Allergies   I have reviewed home medications with patient personally.  Prior to Admission medications    Medication Sig Start Date End Date Taking? Authorizing Provider   phenytoin (DILANTIN) 100 mg ER capsule Take 200 mg by mouth daily Patient takes 100 mg in AM and then 100 mg in PM    Historical Provider, MD     Allergies   Allergen Reactions    Ibuprofen Nausea Only       Objective :  Temp:  [98.6 °F (37 °C)-99.2 °F (37.3 °C)] 99.2 °F (37.3 °C)  HR:  [] 97  BP: (107-143)/(56-97) 134/97  Resp:  [17-20] 20  SpO2:  [98 %-100 %] 100 %  O2 Device: None (Room air)    Physical Exam  Vitals and nursing note reviewed.   Constitutional:       General: She is not in acute distress.     Appearance: She is well-developed. She is obese.   HENT:      Head: Normocephalic and atraumatic.   Eyes:      General: No scleral icterus.     Extraocular Movements: Extraocular movements intact.      Conjunctiva/sclera: Conjunctivae normal.      Pupils: Pupils are equal, round, and reactive to light.   Cardiovascular:      Rate and Rhythm: Normal rate and regular rhythm.      Heart sounds: No murmur heard.  Pulmonary:      Effort: Pulmonary  "effort is normal. No respiratory distress.      Breath sounds: Normal breath sounds.   Abdominal:      Palpations: Abdomen is soft.      Tenderness: There is no abdominal tenderness.   Musculoskeletal:         General: No swelling.      Cervical back: Neck supple.   Skin:     General: Skin is warm and dry.      Capillary Refill: Capillary refill takes less than 2 seconds.   Neurological:      General: No focal deficit present.      Mental Status: She is alert and oriented to person, place, and time.   Psychiatric:         Mood and Affect: Mood normal.       Lines/Drains:            Lab Results: I have reviewed the following results:  Results from last 7 days   Lab Units 11/25/24  1331   WBC Thousand/uL 7.07   HEMOGLOBIN g/dL 12.3   HEMATOCRIT % 36.4   PLATELETS Thousands/uL 164   BANDS PCT % 2   LYMPHO PCT % 25   MONO PCT % 2*   EOS PCT % 0     Results from last 7 days   Lab Units 11/25/24  1331   SODIUM mmol/L 135   POTASSIUM mmol/L 3.8   CHLORIDE mmol/L 106   CO2 mmol/L 22   BUN mg/dL 6   CREATININE mg/dL 0.78   ANION GAP mmol/L 7   CALCIUM mg/dL 8.6   ALBUMIN g/dL 4.1   TOTAL BILIRUBIN mg/dL 0.36   ALK PHOS U/L 56   ALT U/L 19   AST U/L 17   GLUCOSE RANDOM mg/dL 122             No results found for: \"HGBA1C\"  Results from last 7 days   Lab Units 11/25/24  1734   LACTIC ACID mmol/L 1.2   PROCALCITONIN ng/ml <0.05       Imaging Results Review: I reviewed radiology reports from this admission including: CT abdomen/pelvis.  Other Study Results Review: EKG was reviewed.  Other studies reviewed include: cbc, cmp    Administrative Statements   I have spent a total time of 55 minutes in caring for this patient on the day of the visit/encounter including Diagnostic results, Risks and benefits of tx options, Instructions for management, Importance of tx compliance, Impressions, Counseling / Coordination of care, Documenting in the medical record, Reviewing / ordering tests, medicine, procedures  , Obtaining or reviewing " history  , and Communicating with other healthcare professionals .    ** Please Note: This note has been constructed using a voice recognition system. **

## 2024-11-26 NOTE — ASSESSMENT & PLAN NOTE
"39 y.o.  female with reported seizure history on phenytoin,  who presented to [facility] on 11/25/24 with reported seizure like activity.  Patient reportedly compliant with phenytoin at home but phenytoin is from [place].  Phenytoin level in ED less than 2.5.    Workup  - CTH wo contrast 11/25/24:  \"No acute intracranial abnormality.\"  - Labs  - phenytoin level <2.5     Seizure in setting of low phenytoin level and/or acute appendicitis.    Plan  - s/p fosphenytoin 1500 mg IV x1 11/25  - continue phenytoin  mg QD   - Medical management and correction of metabolic and infectious disturbances per primary team   - Avoid CNS altering medications if possible  - Continue supportive care   - Continue to monitor neurologic status. Notify neurology with any changes in exam.       "

## 2024-11-26 NOTE — ASSESSMENT & PLAN NOTE
Known seizure disorder  Presented via EMS after seizure at home with report of fall and possible head strike. Also witnessed again by EMS and then with ED staff  H/o seizures, on Dilantin. Levels found to be subtherapeutic in ED and Neurology consulted.   Treated in ED and patient improved    CT cervical Spine: No cervical spine fracture or traumatic malalignment.   CT Head: No acute intracranial abnormality.     Plan:  Continue with care per medicine team and neurology

## 2024-11-26 NOTE — UTILIZATION REVIEW
Initial Clinical Review    Admission: Date/Time/Statement:   Admission Orders (From admission, onward)       Ordered        11/25/24 1725  INPATIENT ADMISSION  Once                          Orders Placed This Encounter   Procedures    INPATIENT ADMISSION     Standing Status:   Standing     Number of Occurrences:   1     Level of Care:   Med Surg [16]     Bed request comments:   tele     Estimated length of stay:   More than 2 Midnights     Certification:   I certify that inpatient services are medically necessary for this patient for a duration of greater than two midnights. See H&P and MD Progress Notes for additional information about the patient's course of treatment.     ED Arrival Information       Expected   -    Arrival   11/25/2024 12:42    Acuity   Emergent              Means of arrival   Ambulance    Escorted by   [facility] ([facility])    Service   Hospitalist    Admission type   Emergency              Arrival complaint   -             Chief Complaint   Patient presents with    Seizure - Prior Hx Of     Patient arrived via EMS. Patient was online studying when seizure occurred, patient unsure of falling and unsure of head strike. + seizures medications from [place] but unaware of the name.        Initial Presentation: 39 y.o. female to ED from home w/  a PMH of seizure disorder c/o RLQ abd pain . h CT demonstrated thickened appendiceal wall . Admitted IP status w/ acute appy , seizures . Pt had 1 seizure prior to arrival at ED , 2 brief seizures witnessed by nurse in ED . Spoke w/ neuor and  rec fosphenytoin load check EEG. Plan to consult surgery , NPO , cont ceftriaxone and flagyl and monitor labs .     Anticipated Length of Stay/Certification Statement:  Patient will be admitted on an inpatient basis with an anticipated length of stay of greater than 2 midnights secondary to appendicitis.     11/25 Surgery Consult   seizures, acute appendicitis . Plan -Tentative plan for laparoscopic appendectomy pending  clinical course . NPO , IV abx , trend labs , pain control .     Date: 11/26   Day 2: moderate diffuse abdominal pain with palpation. Cont stay for acute appy and new onset seizures . EEG pending . C/w phenytoin and seizure precautions . Adv diet as philip . Cont to tx appy conservatively e/ abx .     11/26 Neuro consult   s/p fosphenytoin 1500 mg IV x1 11/25  - continue phenytoin  mg QD . Cont supportive care and monitor neuro status.    11/26 Surgery Note   Cont conservative therapy w/ IV abx . T max 99 last night . Pain control , amb . DVT ppx . Check cbc in am . Adv diet as philip .    ED Treatment-Medication Administration from 11/25/2024 1242 to 11/25/2024 1928         Date/Time Order Dose Route Action     11/25/2024 1324 sodium chloride 0.9 % bolus 1,000 mL 1,000 mL Intravenous New Bag     11/25/2024 1328 LORazepam (ATIVAN) injection 1 mg 1 mg Intravenous Given     11/25/2024 1325 acetaminophen (Ofirmev) injection 1,000 mg 1,000 mg Intravenous New Bag     11/25/2024 1534 fosphenytoin (CEREBYX) 1,500 mg PE in sodium chloride 0.9 % 100 mL IVPB bolus 1,500 mg PE Intravenous New Bag     11/25/2024 1515 iohexol (OMNIPAQUE) 350 MG/ML injection (MULTI-DOSE) 100 mL 100 mL Intravenous Given     11/25/2024 1742 ceftriaxone (ROCEPHIN) 1 g/50 mL in dextrose IVPB 1,000 mg Intravenous New Bag     11/25/2024 1744 metroNIDAZOLE (FLAGYL) IVPB (premix) 500 mg 100 mL 500 mg Intravenous New Bag            Scheduled Medications:  metroNIDAZOLE, 500 mg, Intravenous, Q8H      Continuous IV Infusions:  sodium chloride, 100 mL/hr, Intravenous, Continuous      PRN Meds:  acetaminophen, 650 mg, Oral, Q6H PRN  ketorolac, 30 mg, Intravenous, Q6H PRN      ED Triage Vitals [11/25/24 1247]   Temperature Pulse Respirations Blood Pressure SpO2 Pain Score   98.6 °F (37 °C) 98 18 130/70 100 % 10 - Worst Possible Pain     Weight (last 2 days)       Date/Time Weight    11/25/24 1937 93.3 (205.69)    11/25/24 1800 99.6 (219.58)    11/25/24 1750  99.6 (219.58)    11/25/24 1700 99.6 (219.58)    11/25/24 1247 99.6 (219.58)            Vital Signs (last 3 days)       Date/Time Temp Pulse Resp BP MAP (mmHg) SpO2 O2 Device Patient Position - Orthostatic VS Carlitos Coma Scale Score Pain    11/26/24 14:19:22 99.6 °F (37.6 °C) 84 -- 142/83 103 100 % -- -- -- --    11/26/24 1030 -- -- -- -- -- -- -- -- -- 5    11/26/24 07:38:45 99.3 °F (37.4 °C) 82 -- 141/81 101 99 % -- -- -- --    11/26/24 0730 -- -- -- -- -- 99 % None (Room air) -- 15 2    11/26/24 03:55:08 -- 84 18 116/75 89 99 % None (Room air) Lying -- --    11/25/24 23:22:56 -- 81 18 120/77 91 99 % None (Room air) Lying -- --    11/25/24 2100 -- -- -- -- -- -- -- -- 15 No Pain    11/25/24 19:42:11 100.7 °F (38.2 °C) 82 20 114/64 81 98 % None (Room air) Lying -- --    11/25/24 1900 -- 97 20 134/97 112 100 % -- -- -- --    11/25/24 1800 99.2 °F (37.3 °C) 92 17 126/76 95 100 % None (Room air) Lying 15 3    11/25/24 1750 99.2 °F (37.3 °C) 99 18 126/64 89 100 % None (Room air) Lying -- No Pain    11/25/24 1700 98.6 °F (37 °C) 100 17 127/69 -- -- None (Room air) Lying 14 3    11/25/24 1630 -- 100 19 118/72 90 100 % None (Room air) Lying -- No Pain    11/25/24 1600 -- 84 17 107/57 75 98 % None (Room air) Lying 14 No Pain    11/25/24 1530 -- 82 19 140/72 99 99 % -- -- -- --    11/25/24 1500 -- 70 20 111/56 79 99 % -- -- -- --    11/25/24 1430 -- 97 18 143/81 103 100 % None (Room air) Lying -- 3    11/25/24 1400 -- 69 17 136/75 98 100 % None (Room air) Lying 14 5    11/25/24 1341 -- -- -- -- -- -- None (Room air) -- -- --    11/25/24 1330 -- 82 17 119/63 84 100 % None (Room air) Lying -- 10 - Worst Possible Pain    11/25/24 1300 -- 82 18 115/64 -- 100 % None (Room air) Lying 14 10 - Worst Possible Pain    11/25/24 1247 98.6 °F (37 °C) 98 18 130/70 94 100 % None (Room air) Lying -- 10 - Worst Possible Pain              Pertinent Labs/Diagnostic Test Results:   Radiology:  CT head without contrast   Final Interpretation by  Parker Glover MD (11/25 1612)      No acute intracranial abnormality.                  Workstation performed: [id number]         CT abdomen pelvis with contrast   Final Interpretation by Brian Song MD (11/25 1617)      The appendiceal wall mildly thickened/indistinct, with adjacent trace right paracolic gutter inflammatory change and several subcentimeter nonpathologically enlarged nodes.   Considering clinical history, early appendicitis suspected.         Finding were discussed with Dr. Eaton from the emergency department at 4:15 p.m. on 11/25/2024         Workstation performed: [id number]         CT spine cervical without contrast   Final Interpretation by Parker Glover MD (11/25 1615)      No cervical spine fracture or traumatic malalignment.                  Workstation performed: [id number]         XR chest 1 view portable   Final Interpretation by Jose G Basilio MD (11/25 1438)      No acute cardiopulmonary disease.            Workstation performed: [id number]           Cardiology:  No orders to display     GI:  No orders to display           Results from last 7 days   Lab Units 11/26/24  0623 11/25/24  1331   WBC Thousand/uL 7.68 7.07   HEMOGLOBIN g/dL 12.3 12.3   HEMATOCRIT % 37.6 36.4   PLATELETS Thousands/uL 172 164   BANDS PCT %  --  2         Results from last 7 days   Lab Units 11/26/24  0623 11/25/24  1331   SODIUM mmol/L 139 135   POTASSIUM mmol/L 3.8 3.8   CHLORIDE mmol/L 108 106   CO2 mmol/L 23 22   ANION GAP mmol/L 8 7   BUN mg/dL 4* 6   CREATININE mg/dL 0.73 0.78   EGFR ml/min/1.73sq m 104 96   CALCIUM mg/dL 8.6 8.6   MAGNESIUM mg/dL 2.0 2.0     Results from last 7 days   Lab Units 11/25/24  1331   AST U/L 17   ALT U/L 19   ALK PHOS U/L 56   TOTAL PROTEIN g/dL 7.0   ALBUMIN g/dL 4.1   TOTAL BILIRUBIN mg/dL 0.36   BILIRUBIN DIRECT mg/dL 0.07       Results from last 7 days   Lab Units 11/26/24  0623 11/25/24  1331   GLUCOSE RANDOM mg/dL 85 122       Results from  last 7 days   Lab Units 11/25/24  1734   PROCALCITONIN ng/ml <0.05     Results from last 7 days   Lab Units 11/25/24  1734   LACTIC ACID mmol/L 1.2     Results from last 7 days   Lab Units 11/25/24  1436   CLARITY UA  Clear   COLOR UA  Colorless   SPEC GRAV UA  1.014   PH UA  7.5   GLUCOSE UA mg/dl Negative   KETONES UA mg/dl Negative   BLOOD UA  Negative   PROTEIN UA mg/dl Negative   NITRITE UA  Negative   BILIRUBIN UA  Negative   UROBILINOGEN UA (BE) mg/dl <2.0   LEUKOCYTES UA  Negative       Results from last 7 days   Lab Units 11/25/24  1436   AMPH/METH  Negative   BARBITURATE UR  Negative   BENZODIAZEPINE UR  Negative   COCAINE UR  Negative   METHADONE URINE  Negative   OPIATE UR  Negative   PCP UR  Negative   THC UR  Negative       Results from last 7 days   Lab Units 11/25/24  1734   BLOOD CULTURE  Received in Microbiology Lab. Culture in Progress.  Received in Microbiology Lab. Culture in Progress.         Past Medical History:   Diagnosis Date    Seizure (HCC)      Present on Admission:  **None**      Admitting Diagnosis: Seizure (HCC) [R56.9]  Seizures (HCC) [R56.9]  Acute appendicitis [K35.80]  Acute right lower quadrant pain [R10.31]  Acute appendicitis, unspecified acute appendicitis type [K35.80]  Age/Sex: 39 y.o. female    Network Utilization Review Department  ATTENTION: Please call with any questions or concerns to [phone] and carefully listen to the prompts so that you are directed to the right person. All voicemails are confidential.   For Discharge needs, contact Care Management DC Support Team at [phone] opt. 2  Send all requests for admission clinical reviews, approved or denied determinations and any other requests to dedicated fax number below belonging to the campus where the patient is receiving treatment. List of dedicated fax numbers for the Facilities:  FACILITY NAME UR FAX NUMBER   ADMISSION DENIALS (Administrative/Medical Necessity) [phone]   DISCHARGE SUPPORT TEAM  (NETWORK) [phone]   PARENT CHILD HEALTH (Maternity/NICU/Pediatrics) [phone]   [facility] [phone]   [facility] [phone]   [facility] [phone]   [facility] [phone]   [facility] [phone]   [facility] [phone]   [facility] [phone]   [facility] [phone]   [facility] [phone]   [facility] [phone]   [facility] [phone]   [facility] [phone]

## 2024-11-26 NOTE — TELEPHONE ENCOUNTER
STILL ADMITTED :11/25/2024 - present (1 day)  [facility]        HFU/ SL CAN/ SEIZURES     Joy Guaman will need follow-up in in 6 weeks with Dr. Donna Davis for Other in 60 minute appointment. They will not require outpatient neurological testing

## 2024-11-26 NOTE — UTILIZATION REVIEW
NOTIFICATION OF INPATIENT ADMISSION   AUTHORIZATION REQUEST   SERVICING FACILITY:   [place] [address]  Tax ID: [id number]  NPI: [id number] ATTENDING PROVIDER:  Attending Name and NPI#: Ghassan Sotomayor Md [[id number]]  Address: [address] [place] [address]  Phone: [phone]     ADMISSION INFORMATION:  Place of Service: Inpatient [facility]  Place of Service Code: 21  Inpatient Admission Date/Time: 11/25/24  5:25 PM  Discharge Date/Time: No discharge date for patient encounter.  Admitting Diagnosis Code/Description:  Seizure (HCC) [R56.9]  Seizures (HCC) [R56.9]  Acute appendicitis [K35.80]  Acute right lower quadrant pain [R10.31]  Acute appendicitis, unspecified acute appendicitis type [K35.80]     UTILIZATION REVIEW CONTACT:  Evie Adame, Utilization   Network Utilization Review Department  Phone: [phone]  Fax [phone]  Email: [email or web address]  Contact for approvals/pending authorizations, clinical reviews, and discharge.     PHYSICIAN ADVISORY SERVICES:  Medical Necessity Denial & Iswo-dq-Prba Review  Phone: [phone]  Fax: [phone]  Email: [email or web address]     DISCHARGE SUPPORT TEAM:  For Patients Discharge Needs & Updates  Phone: [phone] opt. 2 Fax: [phone]  Email: [email or web address]

## 2024-11-26 NOTE — PROGRESS NOTES
Progress Note - Hospitalist   Name: Joy Guaman 39 y.o. female I MRN: [id number]  Unit/Bed#: -01 I Date of Admission: 11/25/2024   Date of Service: 11/26/2024 I Hospital Day: 1    Assessment & Plan  Acute appendicitis with localized peritonitis  Presented to ED w/ c/o abdominal pain, diarrhea, and seizure  CT abd/pelvis:   The appendiceal wall mildly thickened/indistinct, with adjacent trace right paracolic gutter inflammatory change and several subcentimeter nonpathologically enlarged nodes, early appendicitis suspected  General surgery following  Conservative course w/ ABX & serial abdominal exams for now  C/w Ceftin and Flagyl, ABX Day #2   Advance diet as tolerated  Trend white count, fever curve    Recent Labs     11/25/24  1331 11/26/24  0623   WBC 7.07 7.68     Seizure (HCC)  Known seizure disorder; 1 seizure PTA to [facility]; 1 witnessed seizure in ED  Phenytoin level <2.5   Neurology following; appreciate continued recs  EEG pending  S/p fosphenytoin 1500 mg IV x1 11/25   C/w phenytoin  mg QD   Seizure precautions    VTE Pharmacologic Prophylaxis:   Moderate Risk (Score 3-4) - Pharmacological DVT Prophylaxis Ordered: enoxaparin (Lovenox).    Mobility:   Basic Mobility Inpatient Raw Score: 24  JH-HLM Goal: 8: Walk 250 feet or more  JH-HLM Achieved: 7: Walk 25 feet or more  JH-HLM Goal achieved. Continue to encourage appropriate mobility.    Patient Centered Rounds: I performed bedside rounds with nursing staff today.   Discussions with Specialists or Other Care Team Provider: Plan of care reviewed with neurology, general surgery, case management, nursing    Education and Discussions with Family / Patient: Patient declined call to .  Will update her family.    Current Length of Stay: 1 day(s)  Current Patient Status: Inpatient   Certification Statement: The patient will continue to require additional inpatient hospital stay due to acute appendicitis, new onset seizure  Discharge  Plan: Anticipate discharge in 24-48 hrs to home.    Code Status: Level 1 - Full Code    Subjective   Patient is doing okay.  She admits that her abdominal pain has improved since admission.  She still has moderate diffuse abdominal pain with palpation.  She wishes she could eat.  She denies dysuria.  She is passing gas.  No bowel movement since admission.  [employer] blue language line utilized for Swazi translation.    Objective :  Temp:  [98.6 °F (37 °C)-100.7 °F (38.2 °C)] 99.3 °F (37.4 °C)  HR:  [] 82  BP: (107-143)/(56-97) 141/81  Resp:  [17-20] 18  SpO2:  [98 %-100 %] 99 %  O2 Device: None (Room air)    Body mass index is 32.22 kg/m².     Input and Output Summary (last 24 hours):     Intake/Output Summary (Last 24 hours) at 11/26/2024 1153  Last data filed at 11/26/2024 0832  Gross per 24 hour   Intake 2000 ml   Output 2050 ml   Net -50 ml       Physical Exam  Constitutional:       General: She is not in acute distress.     Appearance: She is not toxic-appearing.      Comments: Pleasant, overweight   HENT:      Head: Normocephalic.      Right Ear: External ear normal.      Left Ear: External ear normal.      Nose: Nose normal.      Mouth/Throat:      Mouth: Mucous membranes are moist.      Pharynx: Oropharynx is clear.   Eyes:      Extraocular Movements: Extraocular movements intact.      Conjunctiva/sclera: Conjunctivae normal.   Cardiovascular:      Rate and Rhythm: Normal rate and regular rhythm.      Pulses: Normal pulses.      Heart sounds: Normal heart sounds.   Pulmonary:      Effort: Pulmonary effort is normal.      Breath sounds: Normal breath sounds.   Abdominal:      General: Bowel sounds are normal. There is no distension.      Palpations: Abdomen is soft.      Tenderness: There is abdominal tenderness (Mild to moderate diffuse tenderness, moderate tenderness right lower quadrant). There is no guarding or rebound.   Musculoskeletal:         General: No swelling or deformity. Normal range of  motion.      Cervical back: Normal range of motion.   Skin:     General: Skin is warm and dry.   Neurological:      General: No focal deficit present.      Mental Status: She is alert. Mental status is at baseline.      Cranial Nerves: No cranial nerve deficit.      Motor: No weakness.   Psychiatric:         Mood and Affect: Mood normal.         Behavior: Behavior normal.       Telemetry:  Telemetry Orders (From admission, onward)               24 Hour Telemetry Monitoring  Continuous x 24 Hours (Telem)        Expiring   Question:  Reason for 24 Hour Telemetry  Answer:  Metabolic/electrolyte disturbance with high probability of dysrhythmia. K level <3 or >6 OR KCL infusion >10mEq/hr                     Telemetry Reviewed: Normal Sinus Rhythm  Indication for Continued Telemetry Use: New onset seizure               Lab Results: I have reviewed the following results:   Results from last 7 days   Lab Units 11/26/24 0623 11/25/24  1331   WBC Thousand/uL 7.68 7.07   HEMOGLOBIN g/dL 12.3 12.3   HEMATOCRIT % 37.6 36.4   PLATELETS Thousands/uL 172 164   BANDS PCT %  --  2   LYMPHO PCT % 22 25   MONO PCT % 14* 2*   EOS PCT % 1 0     Results from last 7 days   Lab Units 11/26/24  0623 11/25/24  1331   SODIUM mmol/L 139 135   POTASSIUM mmol/L 3.8 3.8   CHLORIDE mmol/L 108 106   CO2 mmol/L 23 22   BUN mg/dL 4* 6   CREATININE mg/dL 0.73 0.78   ANION GAP mmol/L 8 7   CALCIUM mg/dL 8.6 8.6   ALBUMIN g/dL  --  4.1   TOTAL BILIRUBIN mg/dL  --  0.36   ALK PHOS U/L  --  56   ALT U/L  --  19   AST U/L  --  17   GLUCOSE RANDOM mg/dL 85 122                 Results from last 7 days   Lab Units 11/25/24  1734   LACTIC ACID mmol/L 1.2   PROCALCITONIN ng/ml <0.05       Recent Cultures (last 7 days):   Results from last 7 days   Lab Units 11/25/24  1734   BLOOD CULTURE  Received in Microbiology Lab. Culture in Progress.  Received in Microbiology Lab. Culture in Progress.       Imaging Results Review: I reviewed radiology reports from this  admission including: chest xray, CT abdomen/pelvis, CT head, and CT C-spine.  Other Study Results Review: EKG was reviewed.     Last 24 Hours Medication List:     Current Facility-Administered Medications:     acetaminophen (TYLENOL) tablet 650 mg, Q6H PRN    cefTRIAXone (ROCEPHIN) 1,000 mg in dextrose 5 % 50 mL IVPB, Q24H **AND** metroNIDAZOLE (FLAGYL) IVPB (premix) 500 mg 100 mL, Q12H, Last Rate: 500 mg (11/26/24 1012)    dextrose 5 % and sodium chloride 0.45 % with KCl 20 mEq/L infusion, Continuous, Last Rate: 100 mL/hr (11/26/24 1017)    enoxaparin (LOVENOX) subcutaneous injection 40 mg, Daily    ketorolac (TORADOL) injection 30 mg, Q6H PRN    phenytoin (DILANTIN) ER capsule 100 mg, Daily    Administrative Statements   Today, Patient Was Seen By: Aniya Vences PA-C  I have spent a total time of 45 minutes in caring for this patient on the day of the visit/encounter including Diagnostic results, Prognosis, Risks and benefits of tx options, Instructions for management, Patient and family education, Impressions, Counseling / Coordination of care, Documenting in the medical record, Reviewing / ordering tests, medicine, procedures  , Obtaining or reviewing history  , and Communicating with other healthcare professionals .    **Please Note: This note may have been constructed using a voice recognition system.**

## 2024-11-26 NOTE — ASSESSMENT & PLAN NOTE
38y F pw abdominal pain, diarrhea and seizures  Brought to ED via EMS w report of seizure activity at home and witnessed by EMS and again in ED  Abdomen tender in RLQ w guarding  Dilantin levels found to be subtherapeutic  Afebrile, Tmax 100.7 745pm 11/25, VSS, no leukocytosis  Abdomen    CT AP: The appendiceal wall mildly thickened/indistinct, with adjacent trace right paracolic gutter inflammatory change and several subcentimeter nonpathologically enlarged nodes.  Considering clinical history, early appendicitis suspected.    Plan:  Patient is improving clinically with antibiotics. She is hemodynamically stable and abdominal pain has improved since last night. Will plan to treat conservatively with antibiotics as this time and continue serial abdominal exams and monitor toleration of diet.  Advance diet as tolerated  IV antibiotics for microbial coverage, plan to transition to oral on discharge  Check CBC w diff in the AM  Monitor vitals  Pain control PRN  Ambulation/OOB   DVT prophylaxis   Remainder of management per primary team, SLIM primary  Surgery will continue to follow

## 2024-11-26 NOTE — ASSESSMENT & PLAN NOTE
Presented to ED w/ c/o abdominal pain, diarrhea, and seizure  CT abd/pelvis:   The appendiceal wall mildly thickened/indistinct, with adjacent trace right paracolic gutter inflammatory change and several subcentimeter nonpathologically enlarged nodes, early appendicitis suspected  General surgery following  Conservative course w/ ABX & serial abdominal exams for now  C/w Ceftin and Flagyl, ABX Day #2   Advance diet as tolerated  Trend white count, fever curve    Recent Labs     11/25/24  1331 11/26/24  0623   WBC 7.07 7.68

## 2024-11-26 NOTE — CASE MANAGEMENT
Case Management Assessment & Discharge Planning Note    Patient name Joy Guaman  Location /-01 MRN [id number]  : 1985 Date 2024       Current Admission Date: 2024  Current Admission Diagnosis:Acute appendicitis with localized peritonitis   Patient Active Problem List    Diagnosis Date Noted Date Diagnosed    Acute appendicitis with localized peritonitis 2024     Seizure (HCC)        LOS (days): 1  Geometric Mean LOS (GMLOS) (days):   Days to GMLOS:     OBJECTIVE:    Risk of Unplanned Readmission Score: 5.82         Current admission status: Inpatient  Referral Reason: Other    Preferred Pharmacy:   [id number] [place] - [address]  [address]  [place] [address]  Phone: [phone] Fax: [phone]    Primary Care Provider: No primary care provider on file.    Primary Insurance: [place] MEDICAID  Secondary Insurance:     ASSESSMENT:  Active Health Care Proxies    There are no active Health Care Proxies on file.       Advance Directives  Does patient have a Health Care POA?: No  Was patient offered paperwork?: Yes  Does patient currently have a Health Care decision maker?: No  Does patient have Advance Directives?: No  Was patient offered paperwork?: Yes  Primary Contact: Aunt Mark, Uncle Saintlus         Readmission Root Cause  30 Day Readmission: No    Patient Information  Admitted from:: Home  Mental Status: Alert  During Assessment patient was accompanied by: Not accompanied during assessment  Assessment information provided by:: Patient  Primary Caregiver: Self  Support Systems: Self, Family members  County of Residence: [place]  What city do you live in?: [place]  Home entry access options. Select all that apply.: Stairs  Number of steps to enter home.: 2  Do the steps have railings?: Yes  Type of Current Residence: [address] [place] home  Upon entering residence, is there a bedroom on the main floor (no further  steps)?: Yes  Upon entering residence, is there a bathroom on the main floor (no further steps)?: Yes  Living Arrangements: Lives w/ Family members (aunt and uncle)  Is patient a ?: No    Activities of Daily Living Prior to Admission  Functional Status: Independent  Completes ADLs independently?: Yes  Ambulates independently?: Yes  Does patient use assisted devices?: No  Does patient currently own DME?: No  Does patient have a history of Outpatient Therapy (PT/OT)?: No  Does the patient have a history of Short-Term Rehab?: No  Does patient have a history of HHC?: No  Does patient currently have HHC?: No         Patient Information Continued  Income Source: Unemployed (student)  Does patient have prescription coverage?: Yes  Does patient receive dialysis treatments?: No  Does patient have a history of substance abuse?: No  Does patient have a history of Mental Health Diagnosis?: No         Means of Transportation  Means of Transport to [facility]:: Family transport      Social Determinants of Health (SDOH)      Flowsheet Row Most Recent Value   Housing Stability    In the last 12 months, was there a time when you were not able to pay the mortgage or rent on time? N   In the past 12 months, how many times have you moved where you were living? 0   At any time in the past 12 months, were you homeless or living in a shelter (including now)? N   Transportation Needs    In the past 12 months, has lack of transportation kept you from medical appointments or from getting medications? no   In the past 12 months, has lack of transportation kept you from meetings, work, or from getting things needed for daily living? Yes   Food Insecurity    Within the past 12 months, you worried that your food would run out before you got the money to buy more. Sometimes   Within the past 12 months, the food you bought just didn't last and you didn't have money to get more. Never true   Utilities    In the past 12 months has the electric,  gas, oil, or water company threatened to shut off services in your home? No            DISCHARGE DETAILS:    Discharge planning discussed with:: Patient at the bedside  Freedom of Choice: Yes  Comments - Freedom of Choice: FOC maintained in discussion regarding discharge planning. Patient is alert oriented and competent to make decisions. Introduced self and role, explained role of CM in arranging services such as DME, STR, HHC, and providing community resource information. Discussed current living situation and needs at discharge. Patient is IPTA. CM offered HHC, STR, DME, PCP, OP CM, community resources. Patient accepted resources for food pantry, utility assistance, [place]. Will need Lyft at discharge. Does not use DME. Pt is aware and encouraged to seek CM for any questions or concerns. CM continues to follow.  CM contacted family/caregiver?: No- see comments  Were Treatment Team discharge recommendations reviewed with patient/caregiver?: Yes  Did patient/caregiver verbalize understanding of patient care needs?: Yes  Were patient/caregiver advised of the risks associated with not following Treatment Team discharge recommendations?: Yes    Contacts  Patient Contacts: Aunt and Uncle  Relationship to Patient:: Family  Reason/Outcome: Emergency Contact    Requested Home Health Care         Is the patient interested in HHC at discharge?: No    DME Referral Provided  Referral made for DME?: No    Other Referral/Resources/Interventions Provided:  Interventions: Transportation, FindHelp, PCP, Food Bank  Referral Comments: CM provided [place] copies of LIHE[employer], [employer]/ [employer] assistance programs, food bank information, PCP list, and [place] information. CM will continue to follow for needs.    Would you like to participate in our [facility] service program?  : No - Declined    Treatment Team Recommendation: Home  Discharge Destination Plan:: Home  Transport at Discharge : Ride Share

## 2024-11-27 ENCOUNTER — APPOINTMENT (INPATIENT)
Dept: NEUROLOGY | Facility: HOSPITAL | Age: 39
DRG: 053 | End: 2024-11-27
Attending: STUDENT IN AN ORGANIZED HEALTH CARE EDUCATION/TRAINING PROGRAM
Payer: MEDICAID

## 2024-11-27 VITALS
TEMPERATURE: 98.3 F | BODY MASS INDEX: 32.28 KG/M2 | WEIGHT: 205.69 LBS | DIASTOLIC BLOOD PRESSURE: 77 MMHG | OXYGEN SATURATION: 100 % | RESPIRATION RATE: 14 BRPM | HEIGHT: 67 IN | SYSTOLIC BLOOD PRESSURE: 123 MMHG | HEART RATE: 76 BPM

## 2024-11-27 LAB
ALBUMIN SERPL BCG-MCNC: 3.5 G/DL (ref 3.5–5)
ALP SERPL-CCNC: 47 U/L (ref 34–104)
ALT SERPL W P-5'-P-CCNC: 12 U/L (ref 7–52)
ANION GAP SERPL CALCULATED.3IONS-SCNC: 5 MMOL/L (ref 4–13)
AST SERPL W P-5'-P-CCNC: 12 U/L (ref 13–39)
BASOPHILS # BLD AUTO: 0.05 THOUSANDS/ΜL (ref 0–0.1)
BASOPHILS NFR BLD AUTO: 1 % (ref 0–1)
BILIRUB SERPL-MCNC: 0.45 MG/DL (ref 0.2–1)
BUN SERPL-MCNC: 5 MG/DL (ref 5–25)
CALCIUM SERPL-MCNC: 8.2 MG/DL (ref 8.4–10.2)
CHLORIDE SERPL-SCNC: 109 MMOL/L (ref 96–108)
CO2 SERPL-SCNC: 26 MMOL/L (ref 21–32)
CREAT SERPL-MCNC: 0.77 MG/DL (ref 0.6–1.3)
EOSINOPHIL # BLD AUTO: 0.08 THOUSAND/ΜL (ref 0–0.61)
EOSINOPHIL NFR BLD AUTO: 1 % (ref 0–6)
ERYTHROCYTE [DISTWIDTH] IN BLOOD BY AUTOMATED COUNT: 18.2 % (ref 11.6–15.1)
GFR SERPL CREATININE-BSD FRML MDRD: 97 ML/MIN/1.73SQ M
GLUCOSE SERPL-MCNC: 89 MG/DL (ref 65–140)
HCT VFR BLD AUTO: 34.3 % (ref 34.8–46.1)
HGB BLD-MCNC: 11.4 G/DL (ref 11.5–15.4)
IMM GRANULOCYTES # BLD AUTO: 0.02 THOUSAND/UL (ref 0–0.2)
IMM GRANULOCYTES NFR BLD AUTO: 0 % (ref 0–2)
LYMPHOCYTES # BLD AUTO: 2.35 THOUSANDS/ΜL (ref 0.6–4.47)
LYMPHOCYTES NFR BLD AUTO: 38 % (ref 14–44)
MCH RBC QN AUTO: 24 PG (ref 26.8–34.3)
MCHC RBC AUTO-ENTMCNC: 33.2 G/DL (ref 31.4–37.4)
MCV RBC AUTO: 72 FL (ref 82–98)
MONOCYTES # BLD AUTO: 0.96 THOUSAND/ΜL (ref 0.17–1.22)
MONOCYTES NFR BLD AUTO: 16 % (ref 4–12)
NEUTROPHILS # BLD AUTO: 2.67 THOUSANDS/ΜL (ref 1.85–7.62)
NEUTS SEG NFR BLD AUTO: 44 % (ref 43–75)
NRBC BLD AUTO-RTO: 0 /100 WBCS
PLATELET # BLD AUTO: 146 THOUSANDS/UL (ref 149–390)
POTASSIUM SERPL-SCNC: 3.6 MMOL/L (ref 3.5–5.3)
PROT SERPL-MCNC: 6.2 G/DL (ref 6.4–8.4)
RBC # BLD AUTO: 4.75 MILLION/UL (ref 3.81–5.12)
SODIUM SERPL-SCNC: 140 MMOL/L (ref 135–147)
WBC # BLD AUTO: 6.13 THOUSAND/UL (ref 4.31–10.16)

## 2024-11-27 PROCEDURE — 95816 EEG AWAKE AND DROWSY: CPT

## 2024-11-27 PROCEDURE — 99239 HOSP IP/OBS DSCHRG MGMT >30: CPT | Performed by: NURSE PRACTITIONER

## 2024-11-27 PROCEDURE — 80053 COMPREHEN METABOLIC PANEL: CPT | Performed by: PHYSICIAN ASSISTANT

## 2024-11-27 PROCEDURE — 95816 EEG AWAKE AND DROWSY: CPT | Performed by: STUDENT IN AN ORGANIZED HEALTH CARE EDUCATION/TRAINING PROGRAM

## 2024-11-27 PROCEDURE — 85027 COMPLETE CBC AUTOMATED: CPT | Performed by: SURGERY

## 2024-11-27 PROCEDURE — 99232 SBSQ HOSP IP/OBS MODERATE 35: CPT | Performed by: CLINICAL NURSE SPECIALIST

## 2024-11-27 RX ORDER — PHENYTOIN SODIUM 100 MG/1
100 CAPSULE, EXTENDED RELEASE ORAL EVERY 12 HOURS SCHEDULED
Status: DISCONTINUED | OUTPATIENT
Start: 2024-11-27 | End: 2024-11-28 | Stop reason: HOSPADM

## 2024-11-27 RX ORDER — PHENYTOIN SODIUM 100 MG/1
100 CAPSULE, EXTENDED RELEASE ORAL EVERY 12 HOURS SCHEDULED
Qty: 60 CAPSULE | Refills: 1 | Status: SHIPPED | OUTPATIENT
Start: 2024-11-27

## 2024-11-27 RX ADMIN — ENOXAPARIN SODIUM 40 MG: 40 INJECTION SUBCUTANEOUS at 09:31

## 2024-11-27 RX ADMIN — PHENYTOIN SODIUM 100 MG: 100 CAPSULE ORAL at 09:30

## 2024-11-27 RX ADMIN — CEFTRIAXONE SODIUM 1000 MG: 10 INJECTION, POWDER, FOR SOLUTION INTRAVENOUS at 10:21

## 2024-11-27 RX ADMIN — METRONIDAZOLE 500 MG: 500 INJECTION, SOLUTION INTRAVENOUS at 09:30

## 2024-11-27 RX ADMIN — PHENYTOIN SODIUM 100 MG: 100 CAPSULE ORAL at 21:25

## 2024-11-27 NOTE — ASSESSMENT & PLAN NOTE
Presented to ED w/ c/o abdominal pain, diarrhea, and seizure  CT abd/pelvis:   The appendiceal wall mildly thickened/indistinct, with adjacent trace right paracolic gutter inflammatory change and several subcentimeter nonpathologically enlarged nodes, early appendicitis suspected  General surgery following  Conservative course w/ ABX & serial abdominal exams   Discharged with Augmentin  No indication for surgery at this time  Advance diet as tolerated  Trend white count, fever curve    Recent Labs     11/25/24  1331 11/26/24  0623 11/27/24  0534   WBC 7.07 7.68 6.13

## 2024-11-27 NOTE — ASSESSMENT & PLAN NOTE
Known seizure disorder  Presented via EMS after seizure at home with report of fall and possible head strike. Also witnessed again by EMS and then with ED staff  H/o seizures, on Dilantin. Levels found to be subtherapeutic in ED and Neurology consulted.   Treated in ED and patient improved    CT cervical Spine: No cervical spine fracture or traumatic malalignment.   CT Head: No acute intracranial abnormality.     Plan:

## 2024-11-27 NOTE — PLAN OF CARE
Problem: PAIN - ADULT  Goal: Verbalizes/displays adequate comfort level or baseline comfort level  Description: Interventions:  - Encourage patient to monitor pain and request assistance  - Assess pain using appropriate pain scale  - Administer analgesics based on type and severity of pain and evaluate response  - Implement non-pharmacological measures as appropriate and evaluate response  - Consider cultural and social influences on pain and pain management  - Notify physician/advanced practitioner if interventions unsuccessful or patient reports new pain  11/27/2024 0209 by Jess Cuevas RN  Outcome: Progressing  11/27/2024 0209 by Jess Cuevas RN  Outcome: Progressing     Problem: SAFETY ADULT  Goal: Patient will remain free of falls  Description: INTERVENTIONS:  - Educate patient/family on patient safety including physical limitations  - Instruct patient to call for assistance with activity   - Consult OT/PT to assist with strengthening/mobility   - Keep Call bell within reach  - Keep bed low and locked with side rails adjusted as appropriate  - Keep care items and personal belongings within reach  - Initiate and maintain comfort rounds  - Make Fall Risk Sign visible to staff  - Offer Toileting every  Hours, in advance of need  - Initiate/Maintain alarm  - Obtain necessary fall risk management equipment:   - Apply yellow socks and bracelet for high fall risk patients  - Consider moving patient to room near nurses station  11/27/2024 0209 by Jess Cuevas RN  Outcome: Progressing  11/27/2024 0209 by Jess Cuevas RN  Outcome: Progressing  Goal: Maintain or return to baseline ADL function  Description: INTERVENTIONS:  -  Assess patient's ability to carry out ADLs; assess patient's baseline for ADL function and identify physical deficits which impact ability to perform ADLs (bathing, care of mouth/teeth, toileting, grooming, dressing, etc.)  - Assess/evaluate cause of self-care deficits   - Assess range of motion  - Assess  patient's mobility; develop plan if impaired  - Assess patient's need for assistive devices and provide as appropriate  - Encourage maximum independence but intervene and supervise when necessary  - Involve family in performance of ADLs  - Assess for home care needs following discharge   - Consider OT consult to assist with ADL evaluation and planning for discharge  - Provide patient education as appropriate  11/27/2024 0209 by Jess Cuevas RN  Outcome: Progressing  11/27/2024 0209 by Jess Cuevas RN  Outcome: Progressing  Goal: Maintains/Returns to pre admission functional level  Description: INTERVENTIONS:  - Perform AM-PAC 6 Click Basic Mobility/ Daily Activity assessment daily.  - Set and communicate daily mobility goal to care team and patient/family/caregiver.   - Collaborate with rehabilitation services on mobility goals if consulted  - Perform Range of Motion  times a day.  - Reposition patient every  hours.  - Dangle patient  times a day  - Stand patient  times a day  - Ambulate patient  times a day  - Out of bed to chair  times a day   - Out of bed for meals  times a day  - Out of bed for toileting  - Record patient progress and toleration of activity level   11/27/2024 0209 by Jess Cuevas RN  Outcome: Progressing  11/27/2024 0209 by Jess Cuevas RN  Outcome: Progressing     Problem: INFECTION - ADULT  Goal: Absence or prevention of progression during hospitalization  Description: INTERVENTIONS:  - Assess and monitor for signs and symptoms of infection  - Monitor lab/diagnostic results  - Monitor all insertion sites, i.e. indwelling lines, tubes, and drains  - Monitor endotracheal if appropriate and nasal secretions for changes in amount and color  - [place] appropriate cooling/warming therapies per order  - Administer medications as ordered  - Instruct and encourage patient and family to use good hand hygiene technique  - Identify and instruct in appropriate isolation precautions for identified  infection/condition  Outcome: Progressing

## 2024-11-27 NOTE — DISCHARGE SUMMARY
Discharge Summary - Hospitalist   Name: Joy Guaman 39 y.o. female I MRN: [id number]  Unit/Bed#: -01 I Date of Admission: 11/25/2024   Date of Service: 11/27/2024 I Hospital Day: 2     Assessment & Plan  Acute appendicitis with localized peritonitis  Presented to ED w/ c/o abdominal pain, diarrhea, and seizure  CT abd/pelvis:   The appendiceal wall mildly thickened/indistinct, with adjacent trace right paracolic gutter inflammatory change and several subcentimeter nonpathologically enlarged nodes, early appendicitis suspected  General surgery following  Conservative course w/ ABX & serial abdominal exams   Discharged with Augmentin  No indication for surgery at this time  Advance diet as tolerated  Trend white count, fever curve    Recent Labs     11/25/24  1331 11/26/24  0623 11/27/24  0534   WBC 7.07 7.68 6.13     Seizure (HCC)  Known seizure disorder; 1 seizure PTA to [facility]; 1 witnessed seizure in ED  Phenytoin level <2.5   EEG abnormal  S/p fosphenytoin 1500 mg IV x1 11/25   Patient was apparently taking her Dilantin as needed for headaches from an outpatient provider's recommendations  Neurology evaluated  Continue Dilantin twice daily  Follow-up with neurology outpatient  Seizure precautions    Discharging Physician / Practitioner: YAW Prescott  PCP: No primary care provider on file.  Admission Date:   Admission Orders (From admission, onward)       Ordered        11/25/24 1725  INPATIENT ADMISSION  Once                          Discharge Date: 11/27/24    Medical Problems        Consultations During Hospital Stay:  IP CONSULT TO ACUTE CARE SURGERY  IP CONSULT TO NEUROLOGY  IP CONSULT TO CASE MANAGEMENT    Procedures Performed:   CT abdomen pelvis with contrast  Result Date: 11/25/2024  The appendiceal wall mildly thickened/indistinct, with adjacent trace right paracolic gutter inflammatory change and several subcentimeter nonpathologically enlarged nodes. Considering clinical history,  early appendicitis suspected. Finding were discussed with Dr. Eaton from the emergency department at 4:15 p.m. on 11/25/2024 Workstation performed: [id number]     CT spine cervical without contrast  Result Date: 11/25/2024  No cervical spine fracture or traumatic malalignment. Workstation performed: [id number]     CT head without contrast  Result Date: 11/25/2024  No acute intracranial abnormality. Workstation performed: [id number]     XR chest 1 view portable  Result Date: 11/25/2024  No acute cardiopulmonary disease. Workstation performed: [id number]         Significant Findings / Test Results:   above    Incidental Findings:   above     Test Results Pending at Discharge (will require follow up):   none     Outpatient Tests Requested:  none    Complications:  none    Past Medical History:   Diagnosis Date    Seizure (HCC)        Reason for Admission: Seizure - Prior Hx Of (Patient arrived via EMS. Patient was online studying when seizure occurred, patient unsure of falling and unsure of head strike. + seizures medications from [place] but unaware of the name. )       Hospital Course:     Joy Guaman is a 39 y.o. female patient with past medical history of   has a past medical history of Seizure (HCC). who originally presented to the hospital on 11/25/2024 due to Seizure - Prior Hx Of (Patient arrived via EMS. Patient was online studying when seizure occurred, patient unsure of falling and unsure of head strike. + seizures medications from [place] but unaware of the name. ) patient came in with seizure activity evaluated by neurology and initially cleared for discharge from the ED per neurology however she had CT findings concerning for appendicitis.  Unfortunately it seems like patient's outpatient provider instructed her to only take her seizure medication as needed believing that she was only on it for headaches however she was on her Dilantin for seizures when she stopped taking it regularly  "she had a seizure fortunately hospital neurology was able to remedy this restart her Dilantin prescription provided on discharge outpatient follow-up encouraged as far as appendicitis her symptoms rapidly resolved without any intervention continue antibiotics cleared for discharge from surgery standpoint tolerating her diet    Please see above list of diagnoses and related plan for additional information.     Condition at Discharge: stable     Discharge Day Visit / Exam:     Subjective: Denies any chest pain chest tightness shortness of breath difficulty breathing tolerating her meals agreeable to discharge    Vitals: Blood Pressure: 118/65 (11/27/24 1242)  Pulse: 76 (11/27/24 1242)  Temperature: 97.9 °F (36.6 °C) (11/27/24 0259)  Temp Source: Oral (11/27/24 0300)  Respirations: 14 (11/27/24 0300)  Height: 5' 7\" (170.2 cm) (11/25/24 1937)  Weight - Scale: 93.3 kg (205 lb 11 oz) (11/25/24 1937)  SpO2: 100 % (11/27/24 1242)  Exam:   Physical Exam  Vitals reviewed.   Constitutional:       General: She is not in acute distress.  Cardiovascular:      Rate and Rhythm: Normal rate.   Pulmonary:      Effort: No respiratory distress.   Skin:     General: Skin is warm and dry.   Neurological:      General: No focal deficit present.      Mental Status: She is alert. Mental status is at baseline.   Psychiatric:         Mood and Affect: Mood normal.         Thought Content: Thought content normal.       Discussion with Family: Neurology discussed with patient and family    Discharge instructions/Information to patient and family:   See after visit summary for information provided to patient and family.      Provisions for Follow-Up Care:  See after visit summary for information related to follow-up care and any pertinent home health orders.      Disposition:     Home    Planned Readmission: no     Discharge Statement:  I spent 45 minutes discharging the patient. This time was spent on the day of discharge. I had direct contact " with the patient on the day of discharge. Greater than 50% of the total time was spent examining patient, answering all patient questions, arranging and discussing plan of care with patient as well as directly providing post-discharge instructions.  Additional time then spent on discharge activities.    Discharge Medications:  See after visit summary for reconciled discharge medications provided to patient and family.      ** Please Note: This note has been constructed using a voice recognition system **

## 2024-11-27 NOTE — CASE MANAGEMENT
Case Management Progress Note    Patient name Joy Guaman  Location /-01 MRN [id number]  : 1985 Date 2024       LOS (days): 2  Geometric Mean LOS (GMLOS) (days):   Days to GMLOS:        OBJECTIVE:        Current admission status: Inpatient  Preferred Pharmacy:   [facility] #[id number] - [place] - [address]  [address]  [place] [address]  Phone: [phone] Fax: [phone]    Primary Care Provider: No primary care provider on file.    Primary Insurance: [email or web address] MEDICAID  Secondary Insurance:     PROGRESS NOTE:  Discussed patient's case in interdisciplinary rounds this morning with [facility] provider. Patient is potentially medically cleared for discharge- pending EEG results and neurology clearance. Surgery recommending conservative management and diet advancement. Anticipating discharge today versus tomorrow to home. Will need Lyft. CM will continue to follow.

## 2024-11-27 NOTE — PROGRESS NOTES
Progress Note - Surgery-General   Name: Joy Guaman 39 y.o. female I MRN: [id number]  Unit/Bed#: -01 I Date of Admission: 11/25/2024   Date of Service: 11/27/2024 I Hospital Day: 2     Assessment & Plan  Acute appendicitis with localized peritonitis  38y F pw abdominal pain, diarrhea and seizures  Brought to ED via EMS w report of seizure activity at home and witnessed by EMS and again in ED  Abdomen tender in RLQ w guarding  Dilantin levels found to be subtherapeutic  Afebrile, Tmax 100.7 745pm 11/25, VSS, no leukocytosis  Abdomen    CT AP: The appendiceal wall mildly thickened/indistinct, with adjacent trace right paracolic gutter inflammatory change and several subcentimeter nonpathologically enlarged nodes.  Considering clinical history, early appendicitis suspected.    Plan:  Patient is improving clinically with antibiotics. She is hemodynamically stable and abdominal pain has improved since last night. Will plan to treat conservatively with antibiotics as this time and continue serial abdominal exams and monitor toleration of diet.  Advance diet as tolerated  IV antibiotics for microbial coverage, plan to transition to oral on discharge  Check CBC w diff in the AM  Monitor vitals  Pain control PRN  Ambulation/OOB   DVT prophylaxis   Remainder of management per primary team, [facility] primary  Surgery will continue to follow  Patient cleared for discharge from surgical standpoint.  Follow-up outpatient in 2 weeks with Dr. Donavan Norman ([facility])  Known seizure disorder  Presented via EMS after seizure at home with report of fall and possible head strike. Also witnessed again by EMS and then with ED staff  H/o seizures, on Dilantin. Levels found to be subtherapeutic in ED and Neurology consulted.   Treated in ED and patient improved    CT cervical Spine: No cervical spine fracture or traumatic malalignment.   CT Head: No acute intracranial abnormality.     Plan:      Subjective/Objective   Chief  "Complaint: None    Subjective: No acute events overnight.  Patient reports abdominal pain has resolved.  Patient is tolerating diet without any creasing abdominal pain, nausea, vomiting.  Patient denies any chest pain, shortness of breath, palpitations, fevers, chills.    Objective:     Blood pressure 118/65, pulse 76, temperature 97.9 °F (36.6 °C), resp. rate 14, height 5' 7\" (1.702 m), weight 93.3 kg (205 lb 11 oz), SpO2 100%.  Body mass index is 32.22 kg/m².    I/O         11/25 0701 11/26 0700 11/26 0701 11/27 0700 11/27 0701 11/28 0700    P.O.  0 300    IV Piggyback 2000      Total Intake(mL/kg) 2000 (21.4) 0 (0) 300 (3.2)    Urine (mL/kg/hr) 1750 700 (0.3)     Total Output 1750 700     Net +250 -700 +300           Unmeasured Urine Occurrence  1 x             Invasive Devices       Peripheral Intravenous Line  Duration             Peripheral IV Right Antecubital -- days    Peripheral IV 11/25/24 Left Antecubital 2 days                    Physical Exam: /65   Pulse 76   Temp 97.9 °F (36.6 °C)   Resp 14   Ht 5' 7\" (1.702 m)   Wt 93.3 kg (205 lb 11 oz)   SpO2 100%   BMI 32.22 kg/m²   General appearance: alert and oriented, in no acute distress  Lungs: clear to auscultation bilaterally  Heart: regular rate and rhythm  Abdomen: soft, non-tender; bowel sounds normal; no masses,  no organomegaly  Extremities: extremities normal, warm and well-perfused; no cyanosis, clubbing, or edema    Labs   Recent Results (from the past 24 hours)   CBC and differential    Collection Time: 11/27/24  5:34 AM   Result Value Ref Range    WBC 6.13 4.31 - 10.16 Thousand/uL    RBC 4.75 3.81 - 5.12 Million/uL    Hemoglobin 11.4 (L) 11.5 - 15.4 g/dL    Hematocrit 34.3 (L) 34.8 - 46.1 %    MCV 72 (L) 82 - 98 fL    MCH 24.0 (L) 26.8 - 34.3 pg    MCHC 33.2 31.4 - 37.4 g/dL    RDW 18.2 (H) 11.6 - 15.1 %    Platelets 146 (L) 149 - 390 Thousands/uL    nRBC 0 /100 WBCs    Segmented % 44 43 - 75 %    Immature Grans % 0 0 - 2 %    " Lymphocytes % 38 14 - 44 %    Monocytes % 16 (H) 4 - 12 %    Eosinophils Relative 1 0 - 6 %    Basophils Relative 1 0 - 1 %    Absolute Neutrophils 2.67 1.85 - 7.62 Thousands/µL    Absolute Immature Grans 0.02 0.00 - 0.20 Thousand/uL    Absolute Lymphocytes 2.35 0.60 - 4.47 Thousands/µL    Absolute Monocytes 0.96 0.17 - 1.22 Thousand/µL    Eosinophils Absolute 0.08 0.00 - 0.61 Thousand/µL    Basophils Absolute 0.05 0.00 - 0.10 Thousands/µL   Comprehensive metabolic panel    Collection Time: 11/27/24  5:34 AM   Result Value Ref Range    Sodium 140 135 - 147 mmol/L    Potassium 3.6 3.5 - 5.3 mmol/L    Chloride 109 (H) 96 - 108 mmol/L    CO2 26 21 - 32 mmol/L    ANION GAP 5 4 - 13 mmol/L    BUN 5 5 - 25 mg/dL    Creatinine 0.77 0.60 - 1.30 mg/dL    Glucose 89 65 - 140 mg/dL    Calcium 8.2 (L) 8.4 - 10.2 mg/dL    AST 12 (L) 13 - 39 U/L    ALT 12 7 - 52 U/L    Alkaline Phosphatase 47 34 - 104 U/L    Total Protein 6.2 (L) 6.4 - 8.4 g/dL    Albumin 3.5 3.5 - 5.0 g/dL    Total Bilirubin 0.45 0.20 - 1.00 mg/dL    eGFR 97 ml/min/1.73sq m        Imaging and other studies:  CT abdomen pelvis with contrast  Result Date: 11/25/2024  Impression: The appendiceal wall mildly thickened/indistinct, with adjacent trace right paracolic gutter inflammatory change and several subcentimeter nonpathologically enlarged nodes. Considering clinical history, early appendicitis suspected. Finding were discussed with Dr. Eaton from the emergency department at 4:15 p.m. on 11/25/2024 Workstation performed: [id number]     CT spine cervical without contrast  Result Date: 11/25/2024  Impression: No cervical spine fracture or traumatic malalignment. Workstation performed: [id number]     CT head without contrast  Result Date: 11/25/2024  Impression: No acute intracranial abnormality. Workstation performed: [id number]     XR chest 1 view portable  Result Date: 11/25/2024  Impression: No acute cardiopulmonary disease. Workstation performed: [id number]        VTE Pharmacologic Prophylaxis: Heparin  VTE Mechanical Prophylaxis: sequential compression device    Sb Ochoa PA-C  11/27/2024

## 2024-11-27 NOTE — TELEPHONE ENCOUNTER
Still admitted:11/25/2024 - present (2 days)  [facility]        Dion Gilbert MD  [facility] Hospital Discharge  We sent a previous message to you for her to have outpatient follow up with Dr. Donna Davis, however, she would prefer to be seen at the [place] office by Dr. Rocio Gamboa who is local and an epileptologist and the patient needs to be seen for seizures.  Turkish is also not her preferred language, which is Lithuanian Creole or Mohawk, although her Turkish is much better than her English.      Joy Guaman will need follow-up in 6 weeks with epilepsy team for Other in 60 minute appointment. They will not require outpatient neurological testing.

## 2024-11-27 NOTE — ASSESSMENT & PLAN NOTE
38y F pw abdominal pain, diarrhea and seizures  Brought to ED via EMS w report of seizure activity at home and witnessed by EMS and again in ED  Abdomen tender in RLQ w guarding  Dilantin levels found to be subtherapeutic  Afebrile, Tmax 100.7 745pm 11/25, VSS, no leukocytosis  Abdomen    CT AP: The appendiceal wall mildly thickened/indistinct, with adjacent trace right paracolic gutter inflammatory change and several subcentimeter nonpathologically enlarged nodes.  Considering clinical history, early appendicitis suspected.    Plan:  Patient is improving clinically with antibiotics. She is hemodynamically stable and abdominal pain has improved since last night. Will plan to treat conservatively with antibiotics as this time and continue serial abdominal exams and monitor toleration of diet.  Advance diet as tolerated  IV antibiotics for microbial coverage, plan to transition to oral on discharge  Check CBC w diff in the AM  Monitor vitals  Pain control PRN  Ambulation/OOB   DVT prophylaxis   Remainder of management per primary team, [facility] primary  Surgery will continue to follow  Patient cleared for discharge from surgical standpoint.  Follow-up outpatient in 2 weeks with Dr. Go

## 2024-11-27 NOTE — ASSESSMENT & PLAN NOTE
Known seizure disorder; 1 seizure PTA to [facility]; 1 witnessed seizure in ED  Phenytoin level <2.5   EEG abnormal  S/p fosphenytoin 1500 mg IV x1 11/25   Patient was apparently taking her Dilantin as needed for headaches from an outpatient provider's recommendations  Neurology evaluated  Continue Dilantin twice daily  Follow-up with neurology outpatient  Seizure precautions

## 2024-11-29 NOTE — UTILIZATION REVIEW
NOTIFICATION OF ADMISSION DISCHARGE   This is a Notification of Discharge from [facility]. Please be advised that this patient has been discharge from our facility. Below you will find the admission and discharge date and time including the patient’s disposition.   UTILIZATION REVIEW CONTACT:  Uyen Adame  Utilization   Network Utilization Review Department  Phone: [phone] x carefully listen to the prompts. All voicemails are confidential.  Email: [email or web address]     ADMISSION INFORMATION  PRESENTATION DATE: 11/25/2024 12:42 PM  OBERVATION ADMISSION DATE: N/A  INPATIENT ADMISSION DATE: 11/25/24  5:25 PM   DISCHARGE DATE: 11/27/2024 10:22 PM   DISPOSITION:Home/Self Care    Network Utilization Review Department  ATTENTION: Please call with any questions or concerns to [phone] and carefully listen to the prompts so that you are directed to the right person. All voicemails are confidential.   For Discharge needs, contact Care Management DC Support Team at [phone] opt. 2  Send all requests for admission clinical reviews, approved or denied determinations and any other requests to dedicated fax number below belonging to the campus where the patient is receiving treatment. List of dedicated fax numbers for the Facilities:  FACILITY NAME UR FAX NUMBER   ADMISSION DENIALS (Administrative/Medical Necessity) [phone]   DISCHARGE SUPPORT TEAM ([facility]) [phone]   PARENT CHILD HEALTH (Maternity/NICU/Pediatrics) [phone]   [facility] [phone]   [facility] [phone]   [facility] [phone]   [facility] [phone]   [facility] [phone]   [facility] [phone]   [facility] [phone]   [facility]  [phone]   [facility] [phone]   [facility] [phone]   [facility] [phone]   [facility] [phone]

## 2024-11-30 LAB
BACTERIA BLD CULT: NORMAL
BACTERIA BLD CULT: NORMAL

## 2024-12-03 NOTE — TELEPHONE ENCOUNTER
ME- [place]- 11/27/2024  2nd Attempt,     Called pt rang then sounded as someone answered but no one would speak then hung up...     Letter Sent.    Thank you,     [place]